# Patient Record
Sex: MALE | Race: WHITE | NOT HISPANIC OR LATINO | URBAN - METROPOLITAN AREA
[De-identification: names, ages, dates, MRNs, and addresses within clinical notes are randomized per-mention and may not be internally consistent; named-entity substitution may affect disease eponyms.]

---

## 2018-04-14 ENCOUNTER — INPATIENT (INPATIENT)
Facility: HOSPITAL | Age: 78
LOS: 3 days | Discharge: HOME | DRG: 247 | End: 2018-04-18
Attending: HOSPITALIST | Admitting: HOSPITALIST
Payer: MEDICARE

## 2018-04-14 VITALS — WEIGHT: 279.99 LBS

## 2018-04-14 DIAGNOSIS — I73.9 PERIPHERAL VASCULAR DISEASE, UNSPECIFIED: Chronic | ICD-10-CM

## 2018-04-14 DIAGNOSIS — G56.00 CARPAL TUNNEL SYNDROME, UNSPECIFIED UPPER LIMB: Chronic | ICD-10-CM

## 2018-04-14 DIAGNOSIS — Z98.84 BARIATRIC SURGERY STATUS: Chronic | ICD-10-CM

## 2018-04-14 DIAGNOSIS — K82.9 DISEASE OF GALLBLADDER, UNSPECIFIED: Chronic | ICD-10-CM

## 2018-04-14 DIAGNOSIS — K35.80 UNSPECIFIED ACUTE APPENDICITIS: Chronic | ICD-10-CM

## 2018-04-14 LAB
ALBUMIN SERPL ELPH-MCNC: 4.1 G/DL — SIGNIFICANT CHANGE UP (ref 3.3–5.2)
ALP SERPL-CCNC: 86 U/L — SIGNIFICANT CHANGE UP (ref 40–120)
ALT FLD-CCNC: 21 U/L — SIGNIFICANT CHANGE UP
ANION GAP SERPL CALC-SCNC: 10 MMOL/L — SIGNIFICANT CHANGE UP (ref 5–17)
ANISOCYTOSIS BLD QL: SLIGHT — SIGNIFICANT CHANGE UP
APPEARANCE UR: CLEAR — SIGNIFICANT CHANGE UP
APTT BLD: 30.1 SEC — SIGNIFICANT CHANGE UP (ref 27.5–37.4)
AST SERPL-CCNC: 28 U/L — SIGNIFICANT CHANGE UP
BILIRUB SERPL-MCNC: 0.4 MG/DL — SIGNIFICANT CHANGE UP (ref 0.4–2)
BILIRUB UR-MCNC: NEGATIVE — SIGNIFICANT CHANGE UP
BUN SERPL-MCNC: 23 MG/DL — HIGH (ref 8–20)
CALCIUM SERPL-MCNC: 9.1 MG/DL — SIGNIFICANT CHANGE UP (ref 8.6–10.2)
CHLORIDE SERPL-SCNC: 102 MMOL/L — SIGNIFICANT CHANGE UP (ref 98–107)
CO2 SERPL-SCNC: 27 MMOL/L — SIGNIFICANT CHANGE UP (ref 22–29)
COLOR SPEC: YELLOW — SIGNIFICANT CHANGE UP
CREAT SERPL-MCNC: 1.08 MG/DL — SIGNIFICANT CHANGE UP (ref 0.5–1.3)
DACRYOCYTES BLD QL SMEAR: SLIGHT — SIGNIFICANT CHANGE UP
DIFF PNL FLD: ABNORMAL
EOSINOPHIL NFR BLD AUTO: 1 % — SIGNIFICANT CHANGE UP (ref 0–6)
GLUCOSE SERPL-MCNC: 78 MG/DL — SIGNIFICANT CHANGE UP (ref 70–115)
GLUCOSE UR QL: NEGATIVE MG/DL — SIGNIFICANT CHANGE UP
HCT VFR BLD CALC: 40.4 % — LOW (ref 42–52)
HGB BLD-MCNC: 13.1 G/DL — LOW (ref 14–18)
INR BLD: 0.96 RATIO — SIGNIFICANT CHANGE UP (ref 0.88–1.16)
KETONES UR-MCNC: NEGATIVE — SIGNIFICANT CHANGE UP
LEUKOCYTE ESTERASE UR-ACNC: NEGATIVE — SIGNIFICANT CHANGE UP
LIDOCAIN IGE QN: 57 U/L — HIGH (ref 22–51)
LYMPHOCYTES # BLD AUTO: 14 % — LOW (ref 20–55)
MACROCYTES BLD QL: SLIGHT — SIGNIFICANT CHANGE UP
MCHC RBC-ENTMCNC: 30.3 PG — SIGNIFICANT CHANGE UP (ref 27–31)
MCHC RBC-ENTMCNC: 32.4 G/DL — SIGNIFICANT CHANGE UP (ref 32–36)
MCV RBC AUTO: 93.5 FL — SIGNIFICANT CHANGE UP (ref 80–94)
MICROCYTES BLD QL: SLIGHT — SIGNIFICANT CHANGE UP
MONOCYTES NFR BLD AUTO: 18 % — HIGH (ref 3–10)
NEUTROPHILS NFR BLD AUTO: 61 % — SIGNIFICANT CHANGE UP (ref 37–73)
NITRITE UR-MCNC: NEGATIVE — SIGNIFICANT CHANGE UP
NT-PROBNP SERPL-SCNC: 145 PG/ML — SIGNIFICANT CHANGE UP (ref 0–300)
OVALOCYTES BLD QL SMEAR: SLIGHT — SIGNIFICANT CHANGE UP
PCP SPEC-MCNC: SIGNIFICANT CHANGE UP
PH UR: 6 — SIGNIFICANT CHANGE UP (ref 5–8)
PLAT MORPH BLD: NORMAL — SIGNIFICANT CHANGE UP
PLATELET # BLD AUTO: 167 K/UL — SIGNIFICANT CHANGE UP (ref 150–400)
POIKILOCYTOSIS BLD QL AUTO: SLIGHT — SIGNIFICANT CHANGE UP
POTASSIUM SERPL-MCNC: 4.2 MMOL/L — SIGNIFICANT CHANGE UP (ref 3.5–5.3)
POTASSIUM SERPL-SCNC: 4.2 MMOL/L — SIGNIFICANT CHANGE UP (ref 3.5–5.3)
PROT SERPL-MCNC: 6.7 G/DL — SIGNIFICANT CHANGE UP (ref 6.6–8.7)
PROT UR-MCNC: NEGATIVE MG/DL — SIGNIFICANT CHANGE UP
PROTHROM AB SERPL-ACNC: 10.6 SEC — SIGNIFICANT CHANGE UP (ref 9.8–12.7)
RBC # BLD: 4.32 M/UL — LOW (ref 4.6–6.2)
RBC # FLD: 13 % — SIGNIFICANT CHANGE UP (ref 11–15.6)
RBC BLD AUTO: ABNORMAL
SODIUM SERPL-SCNC: 139 MMOL/L — SIGNIFICANT CHANGE UP (ref 135–145)
SP GR SPEC: 1.01 — SIGNIFICANT CHANGE UP (ref 1.01–1.02)
TROPONIN T SERPL-MCNC: <0.01 NG/ML — SIGNIFICANT CHANGE UP (ref 0–0.06)
UROBILINOGEN FLD QL: NEGATIVE MG/DL — SIGNIFICANT CHANGE UP
VARIANT LYMPHS # BLD: 6 % — SIGNIFICANT CHANGE UP (ref 0–6)
WBC # BLD: 8.5 K/UL — SIGNIFICANT CHANGE UP (ref 4.8–10.8)
WBC # FLD AUTO: 8.5 K/UL — SIGNIFICANT CHANGE UP (ref 4.8–10.8)

## 2018-04-14 PROCEDURE — 99223 1ST HOSP IP/OBS HIGH 75: CPT

## 2018-04-14 PROCEDURE — 71046 X-RAY EXAM CHEST 2 VIEWS: CPT | Mod: 26

## 2018-04-14 PROCEDURE — 99284 EMERGENCY DEPT VISIT MOD MDM: CPT

## 2018-04-14 PROCEDURE — 99497 ADVNCD CARE PLAN 30 MIN: CPT | Mod: 25

## 2018-04-14 PROCEDURE — 93010 ELECTROCARDIOGRAM REPORT: CPT

## 2018-04-14 RX ORDER — ACETAMINOPHEN 500 MG
650 TABLET ORAL EVERY 6 HOURS
Qty: 0 | Refills: 0 | Status: DISCONTINUED | OUTPATIENT
Start: 2018-04-14 | End: 2018-04-18

## 2018-04-14 RX ORDER — HEPARIN SODIUM 5000 [USP'U]/ML
5000 INJECTION INTRAVENOUS; SUBCUTANEOUS EVERY 12 HOURS
Qty: 0 | Refills: 0 | Status: DISCONTINUED | OUTPATIENT
Start: 2018-04-15 | End: 2018-04-18

## 2018-04-14 RX ORDER — SIMVASTATIN 20 MG/1
40 TABLET, FILM COATED ORAL AT BEDTIME
Qty: 0 | Refills: 0 | Status: DISCONTINUED | OUTPATIENT
Start: 2018-04-14 | End: 2018-04-18

## 2018-04-14 RX ORDER — LISINOPRIL 2.5 MG/1
5 TABLET ORAL DAILY
Qty: 0 | Refills: 0 | Status: DISCONTINUED | OUTPATIENT
Start: 2018-04-14 | End: 2018-04-18

## 2018-04-14 RX ORDER — ALLOPURINOL 300 MG
100 TABLET ORAL
Qty: 0 | Refills: 0 | Status: DISCONTINUED | OUTPATIENT
Start: 2018-04-14 | End: 2018-04-18

## 2018-04-14 RX ORDER — ONDANSETRON 8 MG/1
4 TABLET, FILM COATED ORAL EVERY 4 HOURS
Qty: 0 | Refills: 0 | Status: DISCONTINUED | OUTPATIENT
Start: 2018-04-14 | End: 2018-04-18

## 2018-04-14 RX ORDER — ASPIRIN/CALCIUM CARB/MAGNESIUM 324 MG
81 TABLET ORAL DAILY
Qty: 0 | Refills: 0 | Status: DISCONTINUED | OUTPATIENT
Start: 2018-04-14 | End: 2018-04-18

## 2018-04-14 RX ORDER — CLOPIDOGREL BISULFATE 75 MG/1
75 TABLET, FILM COATED ORAL DAILY
Qty: 0 | Refills: 0 | Status: DISCONTINUED | OUTPATIENT
Start: 2018-04-14 | End: 2018-04-18

## 2018-04-14 RX ORDER — COLCHICINE 0.6 MG
0.6 TABLET ORAL DAILY
Qty: 0 | Refills: 0 | Status: DISCONTINUED | OUTPATIENT
Start: 2018-04-14 | End: 2018-04-18

## 2018-04-14 RX ORDER — SODIUM CHLORIDE 9 MG/ML
3 INJECTION INTRAMUSCULAR; INTRAVENOUS; SUBCUTANEOUS ONCE
Qty: 0 | Refills: 0 | Status: COMPLETED | OUTPATIENT
Start: 2018-04-14 | End: 2018-04-14

## 2018-04-14 RX ADMIN — SODIUM CHLORIDE 3 MILLILITER(S): 9 INJECTION INTRAMUSCULAR; INTRAVENOUS; SUBCUTANEOUS at 23:20

## 2018-04-14 NOTE — H&P ADULT - PROBLEM SELECTOR PLAN 4
in setting of nausea and cp  f/u rpt in AM  gentle hydration  NPO  consider abdominal US vs CT scan if trends upward  pt with benign abdominal exam, denies GI history/etoh abuse  utox negative

## 2018-04-14 NOTE — ED PROVIDER NOTE - CONSTITUTIONAL, MLM
normal... Well appearing, well nourished, awake, alert, oriented to person, place, time/situation and in no apparent distress. Morbidly obese, face is flushed, and talking with a hoarse voice.

## 2018-04-14 NOTE — H&P ADULT - PMH
Coronary artery disease involving native coronary artery of native heart without angina pectoris    Gout    High cholesterol    Hypertension    Myocardial infarction, unspecified MI type, unspecified artery  2016 at [facility] s/p PCI

## 2018-04-14 NOTE — H&P ADULT - HISTORY OF PRESENT ILLNESS
77 y/o male pt with PMHx/o CAD s/p PCI x 8? at [facility] 2016?, PVD, HTN, HLD, and gout who presents to the ED c/o intermittent chest pain since mon/tuesday, with onset 0800 today. Pt states the pain is left sided, feels sharp but began as a pinching, radiates up neck and to shoulder and scapula, is associated with nausea and dyspnea on exertion, is present at rest and with exertion, is 6/10 at its worst and 2/10 at least. No alleviating factors. Pt states he has also had leg swelling right more than left but states the legs are not painful and are usually swollen at baseline and worsen with prolonged standing. Pt also endorses recent BP medication changes due to syncopal episodes. Denies palpitations, v/d, constipation, gu sx, dizziness, visual changes, numbness, weakness, dietary changes, recent travel/prolonged sitting, diuretic use.

## 2018-04-14 NOTE — H&P ADULT - PSH
Acute appendicitis, unspecified acute appendicitis type    Acute carpal tunnel syndrome, unspecified laterality    Gallbladder pain    H/O gastric bypass    PVD (peripheral vascular disease)

## 2018-04-14 NOTE — H&P ADULT - ASSESSMENT
77 yo male with pmh/o CAD s/p PCI, HLD, HTN, admitted with chest pain concerning for ACS with high risk.

## 2018-04-14 NOTE — ED PROVIDER NOTE - MUSCULOSKELETAL, MLM
Spine appears normal, range of motion is not limited, no muscle or joint tenderness Left leg is bigger than right leg.

## 2018-04-14 NOTE — ED ADULT NURSE NOTE - OBJECTIVE STATEMENT
Patient A&Ox4 complaining of midsternal sharp chest pain that radiates to the back. Stated pain is intermittent. Also complaining of nausea. Denies any shortness of breath, or dizziness. Respirations even & unlabored, denies any numbness or tingling. Patient A&Ox4 complaining of midsternal sharp chest pain that radiates to the back. Stated pain is intermittent. Also complaining of nausea & increased swelling to right leg. Denies any shortness of breath, or dizziness. Respirations even & unlabored, denies any numbness or tingling.

## 2018-04-14 NOTE — ED PROVIDER NOTE - ENMT, MLM
Airway patent, Nasal mucosa clear. Mouth with normal mucosa. Throat has no vesicles, no oropharyngeal exudates and uvula is midline. Head :NC AT , no swelling  Eyes :eomi, no swelling  Mouth :mm moist,  Neck : supple, trachea in midline

## 2018-04-14 NOTE — ED PROVIDER NOTE - PHYSICAL EXAMINATION
Constitutional : Appears comfortably, talking in full sentences  Head :NC AT , no swelling  Eyes :eomi, no swelling  Mouth :mm moist,  Neck : supple, trachea in midline  Chest :Stiven air entry, symm chest expansion, no distress  Heart :S1 S2 distant  Abdomen :abd soft, non tender  Musc/Skel :ext no swelling, no deformity, no spine tenderness, distal pulses present  Neuro  :AAO 3 no focal deficits

## 2018-04-14 NOTE — H&P ADULT - PROBLEM SELECTOR PLAN 1
admit to monitored bed  EKG reviewed-no STT elevation/inversion, NSR, EKG prn chest pain  NTG prn chest pain  ASA cont  cont ACE  cont statin  serial troponin, first set negative  TTE  HbA1c  lipid panel  cardiology consulted-Dr. Kamran Peralta  cont plavix  strict i/o  fall precautions  O2 via NC prn, BnP wnl  chest x ray reviewed with no active chest disease on prelim read-f/u official  no rales on exam, legs non tender to palpation with non pitting edema, likely chronic however pt with decreased activity due to cp over past week and reports swelling worse than baseline-f/u LE b/l leg doppler  zofran prn nausea  tylenol prn HA  NPO after midnight admit to monitored bed  EKG reviewed-no STT elevation/inversion, NSR, EKG prn chest pain  NTG prn chest pain  ASA cont  cont ACE  cont statin  serial troponin, first set negative  TTE  HbA1c  lipid panel  cardiology consulted-Dr. Peralta  cont plavix  strict i/o  fall precautions  O2 via NC prn, BnP wnl  chest x ray reviewed with no active chest disease on prelim read-f/u official  no rales on exam, legs non tender to palpation with non pitting edema, likely chronic however pt with decreased activity due to cp over past week and reports swelling worse than baseline-f/u LE b/l leg doppler  zofran prn nausea  tylenol prn HA  NPO after midnight

## 2018-04-14 NOTE — ED PROVIDER NOTE - OBJECTIVE STATEMENT
79 y/o male pt with PMHx presents to the ED c/o intermittent chest pain for the past 3 days. 77 y/o male pt with PMHx HTN, HLD, and gout presents to the ED c/o intermittent chest pain for the past 3 days  sharp pain onset 8-9 today lives by himself. similar pain before. pain radiates to back.  notes ha and nausea took meds   was never told he needs a pacemaker   stents last one june 5t.      blood pressure medication was taking off bc pt passed out no fever chills cough or congestion. having trouble wallking. no recent travel . left leg swelling . notes numbness in hands when laying on specific side.        cardiologist- dr muro 79 y/o male pt with PMHx  stents, HTN, HLD, and gout presents to the ED c/o intermittent chest pain onset 0800 today. Pain is described as sharp and radiates to the back. Pt has had similar pain in the past. Notes left leg swelling, HA, nausea, and difficulty walking. He did take all of his medication today and denies being on blood thinners. Pt notes he was taken off his blood pressure medication because he "passed out". Last stent was put in on June 5th of last year. Pt presents to regular check ups with cardiologist and denies being told he needs a pacemaker. He has sleep apnea and is on a CPAP machine.  Also denies fever, chills, cough, congestion, or recent travel. He lives by himself. No further complaints at this time.     Cardiologist: Dr. Nieto

## 2018-04-15 DIAGNOSIS — I24.9 ACUTE ISCHEMIC HEART DISEASE, UNSPECIFIED: ICD-10-CM

## 2018-04-15 DIAGNOSIS — E78.00 PURE HYPERCHOLESTEROLEMIA, UNSPECIFIED: ICD-10-CM

## 2018-04-15 DIAGNOSIS — D64.9 ANEMIA, UNSPECIFIED: ICD-10-CM

## 2018-04-15 DIAGNOSIS — I10 ESSENTIAL (PRIMARY) HYPERTENSION: ICD-10-CM

## 2018-04-15 DIAGNOSIS — R79.9 ABNORMAL FINDING OF BLOOD CHEMISTRY, UNSPECIFIED: ICD-10-CM

## 2018-04-15 DIAGNOSIS — M1A.9XX0 CHRONIC GOUT, UNSPECIFIED, WITHOUT TOPHUS (TOPHI): ICD-10-CM

## 2018-04-15 DIAGNOSIS — R74.8 ABNORMAL LEVELS OF OTHER SERUM ENZYMES: ICD-10-CM

## 2018-04-15 LAB
ALBUMIN SERPL ELPH-MCNC: 3.7 G/DL — SIGNIFICANT CHANGE UP (ref 3.3–5.2)
ALP SERPL-CCNC: 63 U/L — SIGNIFICANT CHANGE UP (ref 40–120)
ALT FLD-CCNC: 20 U/L — SIGNIFICANT CHANGE UP
AMPHET UR-MCNC: NEGATIVE — SIGNIFICANT CHANGE UP
ANION GAP SERPL CALC-SCNC: 11 MMOL/L — SIGNIFICANT CHANGE UP (ref 5–17)
APTT BLD: 38.2 SEC — HIGH (ref 27.5–37.4)
AST SERPL-CCNC: 25 U/L — SIGNIFICANT CHANGE UP
BACTERIA # UR AUTO: ABNORMAL
BARBITURATES UR SCN-MCNC: NEGATIVE — SIGNIFICANT CHANGE UP
BENZODIAZ UR-MCNC: NEGATIVE — SIGNIFICANT CHANGE UP
BILIRUB SERPL-MCNC: 0.5 MG/DL — SIGNIFICANT CHANGE UP (ref 0.4–2)
BUN SERPL-MCNC: 20 MG/DL — SIGNIFICANT CHANGE UP (ref 8–20)
CALCIUM SERPL-MCNC: 8.7 MG/DL — SIGNIFICANT CHANGE UP (ref 8.6–10.2)
CHLORIDE SERPL-SCNC: 107 MMOL/L — SIGNIFICANT CHANGE UP (ref 98–107)
CHOLEST SERPL-MCNC: 81 MG/DL — LOW (ref 110–199)
CO2 SERPL-SCNC: 24 MMOL/L — SIGNIFICANT CHANGE UP (ref 22–29)
COCAINE METAB.OTHER UR-MCNC: NEGATIVE — SIGNIFICANT CHANGE UP
CREAT SERPL-MCNC: 1.04 MG/DL — SIGNIFICANT CHANGE UP (ref 0.5–1.3)
EPI CELLS # UR: SIGNIFICANT CHANGE UP
FERRITIN SERPL-MCNC: 14 NG/ML — LOW (ref 30–400)
FOLATE SERPL-MCNC: 18.2 NG/ML — HIGH (ref 4–16)
GLUCOSE SERPL-MCNC: 100 MG/DL — SIGNIFICANT CHANGE UP (ref 70–115)
HBA1C BLD-MCNC: 5.5 % — SIGNIFICANT CHANGE UP (ref 4–5.6)
HCT VFR BLD CALC: 39.1 % — LOW (ref 42–52)
HDLC SERPL-MCNC: 36 MG/DL — LOW
HGB BLD-MCNC: 12.7 G/DL — LOW (ref 14–18)
INR BLD: 1.04 RATIO — SIGNIFICANT CHANGE UP (ref 0.88–1.16)
IRON SATN MFR SERPL: 15 % — LOW (ref 16–55)
IRON SATN MFR SERPL: 54 UG/DL — LOW (ref 59–158)
LIDOCAIN IGE QN: 43 U/L — SIGNIFICANT CHANGE UP (ref 22–51)
LIPID PNL WITH DIRECT LDL SERPL: 28 MG/DL — SIGNIFICANT CHANGE UP
MAGNESIUM SERPL-MCNC: 2.2 MG/DL — SIGNIFICANT CHANGE UP (ref 1.8–2.6)
MCHC RBC-ENTMCNC: 29.9 PG — SIGNIFICANT CHANGE UP (ref 27–31)
MCHC RBC-ENTMCNC: 32.5 G/DL — SIGNIFICANT CHANGE UP (ref 32–36)
MCV RBC AUTO: 92 FL — SIGNIFICANT CHANGE UP (ref 80–94)
METHADONE UR-MCNC: NEGATIVE — SIGNIFICANT CHANGE UP
OPIATES UR-MCNC: NEGATIVE — SIGNIFICANT CHANGE UP
PCP UR-MCNC: NEGATIVE — SIGNIFICANT CHANGE UP
PHOSPHATE SERPL-MCNC: 3.9 MG/DL — SIGNIFICANT CHANGE UP (ref 2.4–4.7)
PLATELET # BLD AUTO: 151 K/UL — SIGNIFICANT CHANGE UP (ref 150–400)
POTASSIUM SERPL-MCNC: 4.4 MMOL/L — SIGNIFICANT CHANGE UP (ref 3.5–5.3)
POTASSIUM SERPL-SCNC: 4.4 MMOL/L — SIGNIFICANT CHANGE UP (ref 3.5–5.3)
PROT SERPL-MCNC: 5.9 G/DL — LOW (ref 6.6–8.7)
PROTHROM AB SERPL-ACNC: 11.4 SEC — SIGNIFICANT CHANGE UP (ref 9.8–12.7)
RBC # BLD: 4.25 M/UL — LOW (ref 4.6–6.2)
RBC # BLD: 4.25 M/UL — LOW (ref 4.6–6.2)
RBC # FLD: 13.1 % — SIGNIFICANT CHANGE UP (ref 11–15.6)
RBC CASTS # UR COMP ASSIST: SIGNIFICANT CHANGE UP /HPF (ref 0–4)
RETICS #: 6.6 K/UL — LOW (ref 25–125)
RETICS/RBC NFR: 1.6 % — SIGNIFICANT CHANGE UP (ref 0.5–2.5)
SODIUM SERPL-SCNC: 142 MMOL/L — SIGNIFICANT CHANGE UP (ref 135–145)
THC UR QL: NEGATIVE — SIGNIFICANT CHANGE UP
TIBC SERPL-MCNC: 360 UG/DL — SIGNIFICANT CHANGE UP (ref 220–430)
TOTAL CHOLESTEROL/HDL RATIO MEASUREMENT: 2 RATIO — LOW (ref 3.4–9.6)
TRANSFERRIN SERPL-MCNC: 252 MG/DL — SIGNIFICANT CHANGE UP (ref 180–329)
TRIGL SERPL-MCNC: 86 MG/DL — SIGNIFICANT CHANGE UP (ref 10–200)
TROPONIN T SERPL-MCNC: <0.01 NG/ML — SIGNIFICANT CHANGE UP (ref 0–0.06)
TSH SERPL-MCNC: 3.98 UIU/ML — SIGNIFICANT CHANGE UP (ref 0.27–4.2)
WBC # BLD: 6.4 K/UL — SIGNIFICANT CHANGE UP (ref 4.8–10.8)
WBC # FLD AUTO: 6.4 K/UL — SIGNIFICANT CHANGE UP (ref 4.8–10.8)
WBC UR QL: SIGNIFICANT CHANGE UP

## 2018-04-15 PROCEDURE — 99233 SBSQ HOSP IP/OBS HIGH 50: CPT

## 2018-04-15 PROCEDURE — 93970 EXTREMITY STUDY: CPT | Mod: 26

## 2018-04-15 RX ORDER — SODIUM CHLORIDE 9 MG/ML
1000 INJECTION INTRAMUSCULAR; INTRAVENOUS; SUBCUTANEOUS
Qty: 0 | Refills: 0 | Status: COMPLETED | OUTPATIENT
Start: 2018-04-15 | End: 2018-04-15

## 2018-04-15 RX ORDER — MORPHINE SULFATE 50 MG/1
2 CAPSULE, EXTENDED RELEASE ORAL EVERY 4 HOURS
Qty: 0 | Refills: 0 | Status: DISCONTINUED | OUTPATIENT
Start: 2018-04-15 | End: 2018-04-18

## 2018-04-15 RX ADMIN — Medication 650 MILLIGRAM(S): at 01:44

## 2018-04-15 RX ADMIN — Medication 81 MILLIGRAM(S): at 12:31

## 2018-04-15 RX ADMIN — Medication 100 MILLIGRAM(S): at 06:39

## 2018-04-15 RX ADMIN — SIMVASTATIN 40 MILLIGRAM(S): 20 TABLET, FILM COATED ORAL at 23:25

## 2018-04-15 RX ADMIN — ONDANSETRON 4 MILLIGRAM(S): 8 TABLET, FILM COATED ORAL at 08:48

## 2018-04-15 RX ADMIN — Medication 650 MILLIGRAM(S): at 19:05

## 2018-04-15 RX ADMIN — Medication 100 MILLIGRAM(S): at 18:16

## 2018-04-15 RX ADMIN — HEPARIN SODIUM 5000 UNIT(S): 5000 INJECTION INTRAVENOUS; SUBCUTANEOUS at 06:39

## 2018-04-15 RX ADMIN — SODIUM CHLORIDE 80 MILLILITER(S): 9 INJECTION INTRAMUSCULAR; INTRAVENOUS; SUBCUTANEOUS at 00:45

## 2018-04-15 RX ADMIN — LISINOPRIL 5 MILLIGRAM(S): 2.5 TABLET ORAL at 06:38

## 2018-04-15 RX ADMIN — ONDANSETRON 4 MILLIGRAM(S): 8 TABLET, FILM COATED ORAL at 00:44

## 2018-04-15 RX ADMIN — HEPARIN SODIUM 5000 UNIT(S): 5000 INJECTION INTRAVENOUS; SUBCUTANEOUS at 18:16

## 2018-04-15 RX ADMIN — MORPHINE SULFATE 2 MILLIGRAM(S): 50 CAPSULE, EXTENDED RELEASE ORAL at 08:41

## 2018-04-15 RX ADMIN — Medication 0.6 MILLIGRAM(S): at 12:31

## 2018-04-15 RX ADMIN — CLOPIDOGREL BISULFATE 75 MILLIGRAM(S): 75 TABLET, FILM COATED ORAL at 12:31

## 2018-04-15 RX ADMIN — Medication 650 MILLIGRAM(S): at 00:44

## 2018-04-15 RX ADMIN — Medication 650 MILLIGRAM(S): at 18:17

## 2018-04-15 NOTE — PATIENT PROFILE ADULT. - NS TRANSFER PATIENT BELONGINGS
Jewelry/Money (specify)/Clothing Clothing/Jewelry/Money (specify)/Other belongings/Cell Phone/PDA (specify)/shoes, shirt, pants

## 2018-04-15 NOTE — CONSULT NOTE ADULT - SUBJECTIVE AND OBJECTIVE BOX
Patient is a 78y old  Male who presents with a chief complaint of chest pain      HPI:  79 y/o male pt with PMHx/o CAD s/p PCI x 4 at [facility] 2016 as well as in  and , PVD, HTN, HLD, and gout who presents to the ED c/o intermittent chest pain since mon/tuesday, with onset 0800 today. Pt states the pain is left sided, feels sharp but began as a pinching and knife like sensation, radiates up neck and to shoulder and scapula, is associated with nausea and dyspnea on exertion, is present at rest and with exertion. No alleviating factors. Pt states he has also had leg swelling right more than left but states the legs are not painful and are usually swollen at baseline and worsen with prolonged standing. Pt also endorses recent BP medication changes due to syncopal episodes. Denies palpitations, v/d, constipation, gu sx, dizziness, visual changes, numbness, weakness, dietary changes, recent travel/prolonged sitting, diuretic use.      PAST MEDICAL & SURGICAL HISTORY:  Myocardial infarction, unspecified MI type, unspecified artery: 2016 at [facility] s/p PCI  Coronary artery disease involving native coronary artery of native heart without angina pectoris  Gout  High cholesterol  Hypertension  H/O gastric bypass  Gallbladder pain  Acute appendicitis, unspecified acute appendicitis type  Acute carpal tunnel syndrome, unspecified laterality  PVD (peripheral vascular disease)    Allergies    No Known Allergies    Intolerances        MEDICATIONS  (STANDING):  allopurinol 100 milliGRAM(s) Oral two times a day  aspirin  chewable 81 milliGRAM(s) Oral daily  clopidogrel Tablet 75 milliGRAM(s) Oral daily  colchicine 0.6 milliGRAM(s) Oral daily  heparin  Injectable 5000 Unit(s) SubCutaneous every 12 hours  lisinopril 5 milliGRAM(s) Oral daily  simvastatin 40 milliGRAM(s) Oral at bedtime    MEDICATIONS  (PRN):  acetaminophen   Tablet 650 milliGRAM(s) Oral every 6 hours PRN For Temp greater than 38 C (100.4 F)  acetaminophen   Tablet. 650 milliGRAM(s) Oral every 6 hours PRN Moderate Pain (4 - 6)  morphine  - Injectable 2 milliGRAM(s) IV Push every 4 hours PRN Severe Pain (7 - 10)  ondansetron Injectable 4 milliGRAM(s) IV Push every 4 hours PRN Nausea and/or Vomiting    acetaminophen   Tablet 650 milliGRAM(s) Oral every 6 hours PRN  acetaminophen   Tablet. 650 milliGRAM(s) Oral every 6 hours PRN  allopurinol 100 milliGRAM(s) Oral two times a day  aspirin  chewable 81 milliGRAM(s) Oral daily  clopidogrel Tablet 75 milliGRAM(s) Oral daily  colchicine 0.6 milliGRAM(s) Oral daily  heparin  Injectable 5000 Unit(s) SubCutaneous every 12 hours  lisinopril 5 milliGRAM(s) Oral daily  morphine  - Injectable 2 milliGRAM(s) IV Push every 4 hours PRN  ondansetron Injectable 4 milliGRAM(s) IV Push every 4 hours PRN  simvastatin 40 milliGRAM(s) Oral at bedtime      FAMILY HISTORY:  Family history of myocardial infarction (Sibling): brother at 39 yo      SOCIAL HISTORY:    CIGARETTES: Never    ALCOHOL: Rare    REVIEW OF SYSTEMS:  CONSTITUTIONAL: No fever, weight loss, or fatigue  EYES: No eye pain, visual disturbances, or discharge  ENMT:  No difficulty hearing, tinnitus, vertigo; No sinus or throat pain  NECK: No pain or stiffness  RESPIRATORY: No cough, wheezing, chills or hemoptysis; No Shortness of Breath  CARDIOVASCULAR: No chest pain, palpitations, passing out, dizziness, or leg swelling  GASTROINTESTINAL: No abdominal or epigastric pain. No nausea, vomiting, or hematemesis; No diarrhea or constipation. No melena or hematochezia.  GENITOURINARY: No dysuria, frequency, hematuria, or incontinence  NEUROLOGICAL: No headaches, memory loss, loss of strength, numbness, or tremors  SKIN: No itching, burning, rashes, or lesions   LYMPH Nodes: No enlarged glands  ENDOCRINE: No heat or cold intolerance; No hair loss  MUSCULOSKELETAL: No joint pain or swelling; No muscle, back, or extremity pain  PSYCHIATRIC: No depression, anxiety, mood swings, or difficulty sleeping  HEME/LYMPH: No easy bruising, or bleeding gums  ALLERY AND IMMUNOLOGIC: No hives or eczema	    Vital Signs Last 24 Hrs  T(C): 36.8 (15 Apr 2018 10:10), Max: 36.9 (15 Apr 2018 00:10)  T(F): 98.3 (15 Apr 2018 10:10), Max: 98.5 (15 Apr 2018 00:10)  HR: 57 (15 Apr 2018 10:10) (55 - 82)  BP: 132/77 (15 Apr 2018 10:10) (127/60 - 153/70)  BP(mean): --  RR: 17 (15 Apr 2018 10:10) (17 - 20)  SpO2: 98% (15 Apr 2018 10:10) (94% - 98%)    Daily     Daily     I&O's Detail      PHYSICAL EXAM:  Appearance: Normal, well nourished	  HEENT:   Normal oral mucosa, PERRL, EOMI, sclera non-icteric	  Lymphatic: No cervical lymphadenopathy  Cardiovascular: Normal S1 S2, No JVD, No cardiac murmurs, No carotid bruits, No peripheral edema  Respiratory: Lungs clear to auscultation	  Psychiatry: A & O x 3, Mood & affect appropriate  Gastrointestinal:  Soft, Non-tender, + BS, no bruits	  Skin: No rashes, No ecchymoses, No cyanosis  Neurologic: Grossly non-focal with full strength in all four extremities  Extremities: Normal range of motion, No clubbing, cyanosis or edema  Vascular: Peripheral pulses palpable 2+ bilaterally      LABS:                        12.7   6.4   )-----------( 151      ( 15 Apr 2018 05:45 )             39.1     04-15    142  |  107  |  20.0  ----------------------------<  100  4.4   |  24.0  |  1.04    Ca    8.7      15 Apr 2018 05:46  Phos  3.9     04-15  Mg     2.2     04-15    TPro  5.9<L>  /  Alb  3.7  /  TBili  0.5  /  DBili  x   /  AST  25  /  ALT  20  /  AlkPhos  63  04-15    CARDIAC MARKERS ( 15 Apr 2018 05:46 )  x     / <0.01 ng/mL / x     / x     / x      CARDIAC MARKERS ( 2018 22:42 )  x     / <0.01 ng/mL / x     / x     / x          PT/INR - ( 15 Apr 2018 05:45 )   PT: 11.4 sec;   INR: 1.04 ratio         PTT - ( 15 Apr 2018 05:45 )  PTT:38.2 sec  Urinalysis Basic - ( 2018 23:38 )    Color: Yellow / Appearance: Clear / S.010 / pH: x  Gluc: x / Ketone: Negative  / Bili: Negative / Urobili: Negative mg/dL   Blood: x / Protein: Negative mg/dL / Nitrite: Negative   Leuk Esterase: Negative / RBC: 0-2 /HPF / WBC 0-2   Sq Epi: x / Non Sq Epi: Occasional / Bacteria: Occasional      I&O's Summary    BNPSerum Pro-Brain Natriuretic Peptide: 145 pg/mL ( @ 22:42)    RADIOLOGY & ADDITIONAL STUDIES:    Assessment:  79 y/o male pt with PMHx/o CAD s/p PCI x 4 at [facility] 2016 as well as in  and , PVD, HTN, HLD, and gout who presents to the ED c/o intermittent chest pain since mon/tuesday, with onset 0800 today. Pt states the pain is left sided, feels sharp but began as a pinching and knife like sensation, radiates up neck and to shoulder and scapula, is associated with nausea and dyspnea on exertion, is present at rest and with exertion. No alleviating factors. Pt states he has also had leg swelling right more than left but states the legs are not painful and are usually swollen at baseline and worsen with prolonged standing. Pt also endorses recent BP medication changes due to syncopal episodes. Denies palpitations, v/d, constipation, gu sx, dizziness, visual changes, numbness, weakness, dietary changes, recent travel/prolonged sitting, diuretic use.      Plan:  There are some typical and some atypical components to patient's symptoms  At this point I would suggest the following  Echo  Bilateral lower extremity venous doppler  Consider CTA to rule out a PE  Further recommendations as per Dr Peralta who will resume care tomorrow.

## 2018-04-16 LAB
ANION GAP SERPL CALC-SCNC: 11 MMOL/L — SIGNIFICANT CHANGE UP (ref 5–17)
BUN SERPL-MCNC: 21 MG/DL — HIGH (ref 8–20)
CALCIUM SERPL-MCNC: 8.3 MG/DL — LOW (ref 8.6–10.2)
CHLORIDE SERPL-SCNC: 103 MMOL/L — SIGNIFICANT CHANGE UP (ref 98–107)
CO2 SERPL-SCNC: 26 MMOL/L — SIGNIFICANT CHANGE UP (ref 22–29)
CREAT SERPL-MCNC: 1.11 MG/DL — SIGNIFICANT CHANGE UP (ref 0.5–1.3)
GLUCOSE SERPL-MCNC: 101 MG/DL — SIGNIFICANT CHANGE UP (ref 70–115)
HCT VFR BLD CALC: 39.1 % — LOW (ref 42–52)
HGB BLD-MCNC: 12.6 G/DL — LOW (ref 14–18)
MCHC RBC-ENTMCNC: 30.2 PG — SIGNIFICANT CHANGE UP (ref 27–31)
MCHC RBC-ENTMCNC: 32.2 G/DL — SIGNIFICANT CHANGE UP (ref 32–36)
MCV RBC AUTO: 93.8 FL — SIGNIFICANT CHANGE UP (ref 80–94)
PLATELET # BLD AUTO: 168 K/UL — SIGNIFICANT CHANGE UP (ref 150–400)
POTASSIUM SERPL-MCNC: 4.4 MMOL/L — SIGNIFICANT CHANGE UP (ref 3.5–5.3)
POTASSIUM SERPL-SCNC: 4.4 MMOL/L — SIGNIFICANT CHANGE UP (ref 3.5–5.3)
RBC # BLD: 4.17 M/UL — LOW (ref 4.6–6.2)
RBC # FLD: 13.1 % — SIGNIFICANT CHANGE UP (ref 11–15.6)
SODIUM SERPL-SCNC: 140 MMOL/L — SIGNIFICANT CHANGE UP (ref 135–145)
WBC # BLD: 5.3 K/UL — SIGNIFICANT CHANGE UP (ref 4.8–10.8)
WBC # FLD AUTO: 5.3 K/UL — SIGNIFICANT CHANGE UP (ref 4.8–10.8)

## 2018-04-16 PROCEDURE — 99233 SBSQ HOSP IP/OBS HIGH 50: CPT

## 2018-04-16 PROCEDURE — 93306 TTE W/DOPPLER COMPLETE: CPT | Mod: 26

## 2018-04-16 PROCEDURE — 71275 CT ANGIOGRAPHY CHEST: CPT | Mod: 26

## 2018-04-16 PROCEDURE — 93010 ELECTROCARDIOGRAM REPORT: CPT

## 2018-04-16 RX ORDER — ISOSORBIDE MONONITRATE 60 MG/1
30 TABLET, EXTENDED RELEASE ORAL DAILY
Qty: 0 | Refills: 0 | Status: DISCONTINUED | OUTPATIENT
Start: 2018-04-16 | End: 2018-04-17

## 2018-04-16 RX ORDER — ACETAMINOPHEN 500 MG
650 TABLET ORAL EVERY 6 HOURS
Qty: 0 | Refills: 0 | Status: DISCONTINUED | OUTPATIENT
Start: 2018-04-16 | End: 2018-04-18

## 2018-04-16 RX ADMIN — Medication 650 MILLIGRAM(S): at 21:12

## 2018-04-16 RX ADMIN — Medication 100 MILLIGRAM(S): at 06:33

## 2018-04-16 RX ADMIN — ONDANSETRON 4 MILLIGRAM(S): 8 TABLET, FILM COATED ORAL at 21:11

## 2018-04-16 RX ADMIN — HEPARIN SODIUM 5000 UNIT(S): 5000 INJECTION INTRAVENOUS; SUBCUTANEOUS at 17:10

## 2018-04-16 RX ADMIN — Medication 100 MILLIGRAM(S): at 17:10

## 2018-04-16 RX ADMIN — CLOPIDOGREL BISULFATE 75 MILLIGRAM(S): 75 TABLET, FILM COATED ORAL at 11:25

## 2018-04-16 RX ADMIN — ISOSORBIDE MONONITRATE 30 MILLIGRAM(S): 60 TABLET, EXTENDED RELEASE ORAL at 17:10

## 2018-04-16 RX ADMIN — HEPARIN SODIUM 5000 UNIT(S): 5000 INJECTION INTRAVENOUS; SUBCUTANEOUS at 06:33

## 2018-04-16 RX ADMIN — Medication 0.6 MILLIGRAM(S): at 11:25

## 2018-04-16 RX ADMIN — Medication 81 MILLIGRAM(S): at 11:26

## 2018-04-16 RX ADMIN — SIMVASTATIN 40 MILLIGRAM(S): 20 TABLET, FILM COATED ORAL at 21:11

## 2018-04-17 LAB
ANION GAP SERPL CALC-SCNC: 12 MMOL/L — SIGNIFICANT CHANGE UP (ref 5–17)
BUN SERPL-MCNC: 21 MG/DL — HIGH (ref 8–20)
CALCIUM SERPL-MCNC: 8.1 MG/DL — LOW (ref 8.6–10.2)
CHLORIDE SERPL-SCNC: 105 MMOL/L — SIGNIFICANT CHANGE UP (ref 98–107)
CO2 SERPL-SCNC: 25 MMOL/L — SIGNIFICANT CHANGE UP (ref 22–29)
CREAT SERPL-MCNC: 1.16 MG/DL — SIGNIFICANT CHANGE UP (ref 0.5–1.3)
GLUCOSE SERPL-MCNC: 103 MG/DL — SIGNIFICANT CHANGE UP (ref 70–115)
HCT VFR BLD CALC: 36.6 % — LOW (ref 42–52)
HGB BLD-MCNC: 11.8 G/DL — LOW (ref 14–18)
MCHC RBC-ENTMCNC: 30.3 PG — SIGNIFICANT CHANGE UP (ref 27–31)
MCHC RBC-ENTMCNC: 32.2 G/DL — SIGNIFICANT CHANGE UP (ref 32–36)
MCV RBC AUTO: 93.8 FL — SIGNIFICANT CHANGE UP (ref 80–94)
PLATELET # BLD AUTO: 176 K/UL — SIGNIFICANT CHANGE UP (ref 150–400)
POTASSIUM SERPL-MCNC: 4.3 MMOL/L — SIGNIFICANT CHANGE UP (ref 3.5–5.3)
POTASSIUM SERPL-SCNC: 4.3 MMOL/L — SIGNIFICANT CHANGE UP (ref 3.5–5.3)
RBC # BLD: 3.9 M/UL — LOW (ref 4.6–6.2)
RBC # FLD: 12.8 % — SIGNIFICANT CHANGE UP (ref 11–15.6)
SODIUM SERPL-SCNC: 142 MMOL/L — SIGNIFICANT CHANGE UP (ref 135–145)
WBC # BLD: 5.9 K/UL — SIGNIFICANT CHANGE UP (ref 4.8–10.8)
WBC # FLD AUTO: 5.9 K/UL — SIGNIFICANT CHANGE UP (ref 4.8–10.8)

## 2018-04-17 PROCEDURE — 99232 SBSQ HOSP IP/OBS MODERATE 35: CPT

## 2018-04-17 PROCEDURE — 93010 ELECTROCARDIOGRAM REPORT: CPT

## 2018-04-17 RX ADMIN — CLOPIDOGREL BISULFATE 75 MILLIGRAM(S): 75 TABLET, FILM COATED ORAL at 12:36

## 2018-04-17 RX ADMIN — Medication 81 MILLIGRAM(S): at 12:36

## 2018-04-17 RX ADMIN — Medication 100 MILLIGRAM(S): at 21:49

## 2018-04-17 RX ADMIN — ISOSORBIDE MONONITRATE 30 MILLIGRAM(S): 60 TABLET, EXTENDED RELEASE ORAL at 12:36

## 2018-04-17 RX ADMIN — SIMVASTATIN 40 MILLIGRAM(S): 20 TABLET, FILM COATED ORAL at 21:49

## 2018-04-17 RX ADMIN — Medication 0.6 MILLIGRAM(S): at 12:36

## 2018-04-17 NOTE — PROGRESS NOTE ADULT - ASSESSMENT
Problem: ACS (acute coronary syndrome).  Plan:   EKG reviewed-no STT elevation/inversion, NSR, EKG . CXR no pathology  NTG prn chest pain  ASA 81mg po daily  Plavix 75mg po daily    cont ACE Lisinopril 5mg po daily   cont statin simvastatin 20mg po daily   serial troponin, first set negative  TTE ordered , Leg dopplers ordered.  HbA1c 5.5  lipid panel- no elevation   cardiology consulted-Dr. Peralta.  Associate review, recommend echo, leg dopplers , CTA to rule out PE.        Problem/Plan - 2:  ·  Problem: Elevated BUN.  Plan: Resolved on gentle hydration .      Problem/Plan - 4:  ·  Problem: Elevated lipase.  Plan: in setting of nausea and cp  f/u rpt in AM is normal value.  pt with benign abdominal exam, denies GI history/etoh abuse  utox negative.      Problem/Plan - 5:  ·  Problem: High cholesterol.  Plan: cont statin  f/u lipid panel.      Problem/Plan - 6:  Problem: Essential hypertension. Plan: cont lisinopril at home dose.     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  Plan: cont allopurinol and colchicine at home dose.
Problem: ACS (acute coronary syndrome).  Plan:   EKG reviewed-no STT elevation/inversion, NSR, EKG . CXR no pathology, NTG prn chest pain, ASA 81mg po daily, Plavix 75mg po daily, cont ACE Lisinopril 5mg po daily,   cont statin simvastatin 20mg po daily, serial troponin, negative, TTE ordered done showed Normal global left ventricular systolic function, Left ventricular ejection fraction 65 to 70%, Sclerotic aortic valve with normal opening, Mild aortic regurgitation, The ascending aorta is dilated measuring 4.3 cm, Leg dopplers No evidence of bilateral lower extremity deep venous thrombosis, HbA1c 5.5, lipid panel- no elevation cardiology consulted-Dr. Peralta saw the patient, plan is cardiac cath tomorrow, CTA chest showed No pulmonary embolism or other acute findings.       Problem/Plan - 2:  ·  Problem: Elevated BUN.  Plan: Gentle hydration, will continued to monitor BMP      Problem/Plan - 4:  ·  Problem: Elevated lipase: In setting of nausea and cp, no more nausea, f/u rpt in AM is normal value, pt with benign abdominal exam, denies GI history etoh abuse  utox negative, repeat Lipase is normal.       Problem/Plan - 5:  ·  Problem: High cholesterol.  Plan: cont statin, f/u lipid panel.      Problem/Plan - 6:  Problem: Essential hypertension. Plan: cont lisinopril at home dose.     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  Plan: cont allopurinol and colchicine at home dose.
Problem: ACS (acute coronary syndrome).  Plan:   EKG reviewed-no STT elevation/inversion, NSR, EKG . CXR no pathology, NTG prn chest pain, ASA 81mg po daily, Plavix 75mg po daily, cont ACE Lisinopril 5mg po daily, cont statin simvastatin 20mg po daily, serial troponin, negative, TTE ordered done showed Normal global left ventricular systolic function, Left ventricular ejection fraction 65 to 70%, Sclerotic aortic valve with normal opening, Mild aortic regurgitation, The ascending aorta is dilated measuring 4.3 cm, Leg dopplers No evidence of bilateral lower extremity deep venous thrombosis, HbA1c 5.5, lipid panel- no elevation, CTA chest showed No pulmonary embolism or other acute findings cardiology consulted-Dr. Peralta saw the patient, plan is going for the cardiac cath today, will follow the results.        Problem/Plan - 2:  ·  Problem: Elevated BUN.  Plan: got gentle hydration, will continued to monitor BMP      Problem/Plan - 4:  ·  Problem: Elevated lipase: In setting of nausea and cp, no more nausea, f/u rpt in AM is normal value, pt with benign abdominal exam, denies GI history etoh abuse, utox negative, repeat Lipase is normal.       Problem/Plan - 5:  ·  Problem: High cholesterol.  Plan: cont statin, f/u lipid panel.      Problem/Plan - 6:  Problem: Essential hypertension. Plan: cont lisinopril at home dose.     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  Plan: cont allopurinol and colchicine at home dose.

## 2018-04-18 ENCOUNTER — TRANSCRIPTION ENCOUNTER (OUTPATIENT)
Age: 78
End: 2018-04-18

## 2018-04-18 VITALS
RESPIRATION RATE: 18 BRPM | HEART RATE: 60 BPM | DIASTOLIC BLOOD PRESSURE: 68 MMHG | SYSTOLIC BLOOD PRESSURE: 140 MMHG | OXYGEN SATURATION: 97 % | TEMPERATURE: 98 F

## 2018-04-18 LAB
ANION GAP SERPL CALC-SCNC: 8 MMOL/L — SIGNIFICANT CHANGE UP (ref 5–17)
APTT BLD: 29.6 SEC — SIGNIFICANT CHANGE UP (ref 27.5–37.4)
BUN SERPL-MCNC: 17 MG/DL — SIGNIFICANT CHANGE UP (ref 8–20)
CALCIUM SERPL-MCNC: 8.4 MG/DL — LOW (ref 8.6–10.2)
CHLORIDE SERPL-SCNC: 104 MMOL/L — SIGNIFICANT CHANGE UP (ref 98–107)
CO2 SERPL-SCNC: 29 MMOL/L — SIGNIFICANT CHANGE UP (ref 22–29)
CREAT SERPL-MCNC: 1.21 MG/DL — SIGNIFICANT CHANGE UP (ref 0.5–1.3)
GLUCOSE SERPL-MCNC: 87 MG/DL — SIGNIFICANT CHANGE UP (ref 70–115)
HCT VFR BLD CALC: 37.9 % — LOW (ref 42–52)
HGB BLD-MCNC: 12.4 G/DL — LOW (ref 14–18)
INR BLD: 1.01 RATIO — SIGNIFICANT CHANGE UP (ref 0.88–1.16)
MCHC RBC-ENTMCNC: 30.6 PG — SIGNIFICANT CHANGE UP (ref 27–31)
MCHC RBC-ENTMCNC: 32.7 G/DL — SIGNIFICANT CHANGE UP (ref 32–36)
MCV RBC AUTO: 93.6 FL — SIGNIFICANT CHANGE UP (ref 80–94)
PLATELET # BLD AUTO: 177 K/UL — SIGNIFICANT CHANGE UP (ref 150–400)
POTASSIUM SERPL-MCNC: 4.2 MMOL/L — SIGNIFICANT CHANGE UP (ref 3.5–5.3)
POTASSIUM SERPL-SCNC: 4.2 MMOL/L — SIGNIFICANT CHANGE UP (ref 3.5–5.3)
PROTHROM AB SERPL-ACNC: 11.1 SEC — SIGNIFICANT CHANGE UP (ref 9.8–12.7)
RBC # BLD: 4.05 M/UL — LOW (ref 4.6–6.2)
RBC # FLD: 12.7 % — SIGNIFICANT CHANGE UP (ref 11–15.6)
SODIUM SERPL-SCNC: 141 MMOL/L — SIGNIFICANT CHANGE UP (ref 135–145)
WBC # BLD: 7.3 K/UL — SIGNIFICANT CHANGE UP (ref 4.8–10.8)
WBC # FLD AUTO: 7.3 K/UL — SIGNIFICANT CHANGE UP (ref 4.8–10.8)

## 2018-04-18 PROCEDURE — 93010 ELECTROCARDIOGRAM REPORT: CPT

## 2018-04-18 PROCEDURE — 99239 HOSP IP/OBS DSCHRG MGMT >30: CPT

## 2018-04-18 RX ORDER — CLOPIDOGREL BISULFATE 75 MG/1
1 TABLET, FILM COATED ORAL
Qty: 30 | Refills: 1
Start: 2018-04-18 | End: 2018-06-16

## 2018-04-18 RX ORDER — ASPIRIN/CALCIUM CARB/MAGNESIUM 324 MG
1 TABLET ORAL
Qty: 0 | Refills: 0 | DISCHARGE
Start: 2018-04-18

## 2018-04-18 RX ORDER — LISINOPRIL 2.5 MG/1
1 TABLET ORAL
Qty: 0 | Refills: 0 | DISCHARGE
Start: 2018-04-18

## 2018-04-18 RX ORDER — ASPIRIN/CALCIUM CARB/MAGNESIUM 324 MG
1 TABLET ORAL
Qty: 0 | Refills: 0 | COMMUNITY

## 2018-04-18 RX ORDER — LISINOPRIL 2.5 MG/1
1 TABLET ORAL
Qty: 0 | Refills: 0 | COMMUNITY

## 2018-04-18 RX ORDER — CLOPIDOGREL BISULFATE 75 MG/1
1 TABLET, FILM COATED ORAL
Qty: 0 | Refills: 0 | COMMUNITY

## 2018-04-18 RX ADMIN — HEPARIN SODIUM 5000 UNIT(S): 5000 INJECTION INTRAVENOUS; SUBCUTANEOUS at 11:03

## 2018-04-18 RX ADMIN — Medication 0.6 MILLIGRAM(S): at 11:03

## 2018-04-18 RX ADMIN — Medication 100 MILLIGRAM(S): at 05:54

## 2018-04-18 RX ADMIN — CLOPIDOGREL BISULFATE 75 MILLIGRAM(S): 75 TABLET, FILM COATED ORAL at 11:02

## 2018-04-18 RX ADMIN — Medication 81 MILLIGRAM(S): at 11:03

## 2018-04-18 RX ADMIN — LISINOPRIL 5 MILLIGRAM(S): 2.5 TABLET ORAL at 11:03

## 2018-04-18 NOTE — DISCHARGE NOTE ADULT - PLAN OF CARE
Continue with current treatment, follow up with Dr Wan Low Fat and low sodium diet Follow up with Dr Wan drink plenty of water Resolved

## 2018-04-18 NOTE — DISCHARGE NOTE ADULT - CARE PLAN
Principal Discharge DX:	ACS (acute coronary syndrome)  Goal:	Continue with current treatment, follow up with Dr Wan  Assessment and plan of treatment:	Low Fat and low sodium diet  Secondary Diagnosis:	CAD (coronary artery disease)  Goal:	Follow up with Dr Wan  Secondary Diagnosis:	Chronic gout without tophus, unspecified cause, unspecified site  Secondary Diagnosis:	Elevated BUN  Goal:	drink plenty of water  Secondary Diagnosis:	Elevated lipase  Goal:	Resolved  Secondary Diagnosis:	Essential hypertension

## 2018-04-18 NOTE — DISCHARGE NOTE ADULT - PROVIDER TOKENS
FREE:[LAST:[Dr Wan],PHONE:[(   )    -],FAX:[(   )    -],ADDRESS:[Cardiology in 2 weeks, please call his office and get an appiontment]],FREE:[LAST:[Dr Jeff],PHONE:[(   )    -],FAX:[(   )    -],ADDRESS:[PMD in 1 week, please call his office and get an appiontment.]]

## 2018-04-18 NOTE — DISCHARGE NOTE ADULT - MEDICATION SUMMARY - MEDICATIONS TO TAKE
I will START or STAY ON the medications listed below when I get home from the hospital:    aspirin 81 mg oral tablet, chewable  -- 1 tab(s) by mouth once a day  -- Indication: For CAD    lisinopril 5 mg oral tablet  -- 1 tab(s) by mouth once a day  -- Indication: For HTN     colchicine 0.6 mg oral tablet  -- 1 tab(s) by mouth once a day  -- Indication: For Gout     allopurinol 100 mg oral tablet  -- 1 tab(s) by mouth 2 times a day  -- Indication: For Gout     simvastatin 40 mg oral tablet  -- 1 tab(s) by mouth once a day (at bedtime)  -- Indication: For HLD    clopidogrel 75 mg oral tablet  -- 1 tab(s) by mouth once a day  -- Indication: For CAD

## 2018-04-18 NOTE — DISCHARGE NOTE ADULT - HOSPITAL COURSE
HPI:   77 y/o male pt with PMHx/o CAD s/p PCI x 8? at [facility] 2016?, PVD, HTN, HLD, and gout who presents to the ED c/o intermittent chest pain since mon/tuesday, with onset 0800 today. Pt states the pain is left sided, feels sharp but began as a pinching, radiates up neck and to shoulder and scapula, is associated with nausea and dyspnea on exertion, is present at rest and with exertion, is 6/10 at its worst and 2/10 at least. No alleviating factors. Pt states he has also had leg swelling right more than left but states the legs are not painful and are usually swollen at baseline and worsen with prolonged standing. Pt also endorses recent BP medication changes due to syncopal episodes. Denies palpitations, v/d, constipation, gu sx, dizziness, visual changes, numbness, weakness, dietary changes, recent travel/prolonged sitting, diuretic use.     Hospital course:   patient was admitted under medicine, hooked up with cardiac monitor, EKG reviewed-no STT elevation/inversion, NSR, EKG, CXR no pathology, NTG prn chest pain, ASA 81mg po daily, Plavix 75mg po daily, cont ACE Lisinopril 5mg po daily, cont statin simvastatin, serial troponin, negative, TTE ordered done showed Normal global left ventricular systolic function, Left ventricular ejection fraction 65 to 70%, Sclerotic aortic valve with normal opening, Mild aortic regurgitation, The ascending aorta is dilated measuring 4.3 cm, Leg dopplers No evidence of bilateral lower extremity deep venous thrombosis, HbA1c 5.5, lipid panel- no elevation, CTA chest showed No pulmonary embolism or other acute findings cardiology consulted-Dr. Peralta saw the patient, patient had cardiac cath and s/p RENATE to the mid LAD, patient has no more symptoms, denies any more SOB or chest pressure or pain.     For his Elevated BUN, he got gentle hydration, monitored BMP, looks like at his baseline, during the earlier course, he was found to have Elevated lipase in setting of nausea and cp, got IV fluids, no more nausea, repeat lipase WNL, benign abdominal exam no abdominal pain.     patient is doing well, his symptoms resolved, he is being discharged home in a stable condition.     Vital Signs Last 24 Hrs  T(C): 36.7 (18 Apr 2018 10:09), Max: 36.7 (18 Apr 2018 10:09)  T(F): 98 (18 Apr 2018 10:09), Max: 98 (18 Apr 2018 10:09)  HR: 62 (18 Apr 2018 10:09) (56 - 62)  BP: 146/66 (18 Apr 2018 10:09) (96/54 - 146/66)  RR: 18 (18 Apr 2018 10:09) (17 - 20)  SpO2: 97% (18 Apr 2018 05:49) (94% - 98%)    PHYSICAL EXAM:    GENERAL: Elderly male looking comfortable   HEENT: PERRL, +EOMI  NECK: soft, Supple, No JVD,   CHEST/LUNG: Decrease air entry bilaterally; No wheezing  HEART: S1S2+, Regular rate and rhythm; No murmurs  ABDOMEN: Soft, Nontender, Nondistended; Bowel sounds present  EXTREMITIES:  1+ Peripheral Pulses, No edema  SKIN: No rashes or lesions  NEURO: AAOX3, no focal deficits, no motor r sensory loss  PSYCH: normal mood    Total time spent 40minutes.

## 2018-04-18 NOTE — DISCHARGE NOTE ADULT - SECONDARY DIAGNOSIS.
CAD (coronary artery disease) Chronic gout without tophus, unspecified cause, unspecified site Elevated BUN Elevated lipase Essential hypertension

## 2018-04-18 NOTE — PROGRESS NOTE ADULT - SUBJECTIVE AND OBJECTIVE BOX
CC: CAD, obesity, intermittent chest pain. Right calf pain and swelling .   HPI:  77 y/o male pt with PMHx/o CAD s/p PCI x 8? at [facility] 2016?, PVD, HTN, HLD, and gout who presents to the ED c/o intermittent chest pain since mon/tuesday, with onset 0800 today. Pt states the pain is left sided, feels sharp but began as a pinching, radiates up neck and to shoulder and scapula, is associated with nausea and dyspnea on exertion, is present at rest and with exertion, is 6/10 at its worst and 2/10 at least. No alleviating factors. Pt states he has also had leg swelling right more than left but states the legs are not painful and are usually swollen at baseline and worsen with prolonged standing. Pt also endorses recent BP medication changes due to syncopal episodes. Denies palpitations, v/d, constipation, gu sx, dizziness, visual changes, numbness, weakness, dietary changes, recent travel/prolonged sitting, diuretic use. (2018 23:32)    REVIEW OF SYSTEMS:    Patient denied fever, chills, abdominal pain, nausea, vomiting, cough, shortness of breath, chest pain or palpitations    Vital Signs Last 24 Hrs  T(C): 36.8 (15 Apr 2018 10:10), Max: 36.9 (15 Apr 2018 00:10)  T(F): 98.3 (15 Apr 2018 10:10), Max: 98.5 (15 Apr 2018 00:10)  HR: 57 (15 Apr 2018 10:10) (55 - 82)  BP: 132/77 (15 Apr 2018 10:10) (127/60 - 153/70)  BP(mean): --  RR: 17 (15 Apr 2018 10:10) (17 - 20)  SpO2: 98% (15 Apr 2018 10:10) (94% - 98%)I&O's Summary    PHYSICAL EXAM:  GENERAL: NAD, Obese  HEENT: PERRL, +EOMI, anicteric, no [place]  NECK: Supple, No JVD   CHEST/LUNG: CTA bilaterally; Normal effort  HEART: S1S2 Normal intensity, no murmurs, gallops or rubs noted  ABDOMEN: Soft, BS Normoactive, NT, ND, no HSM noted  EXTREMITIES:  2+ radial and DP pulses noted, no clubbing, cyanosis, or edema noted, Limited mobility   SKIN: No rashes or lesions noted  NEURO: A&Ox3, no focal deficits noted, CN II-XII intact  PSYCH: Anxious and depressed mood and affect; insight/judgement appropriate  LABS:                        12.7   6.4   )-----------( 151      ( 15 Apr 2018 05:45 )             39.1     04-15    142  |  107  |  20.0  ----------------------------<  100  4.4   |  24.0  |  1.04    Ca    8.7      15 Apr 2018 05:46  Phos  3.9     04-15  Mg     2.2     04-15    TPro  5.9<L>  /  Alb  3.7  /  TBili  0.5  /  DBili  x   /  AST  25  /  ALT  20  /  AlkPhos  63  04-15    PT/INR - ( 15 Apr 2018 05:45 )   PT: 11.4 sec;   INR: 1.04 ratio         PTT - ( 15 Apr 2018 05:45 )  PTT:38.2 sec  Urinalysis Basic - ( 2018 23:38 )    Color: Yellow / Appearance: Clear / S.010 / pH: x  Gluc: x / Ketone: Negative  / Bili: Negative / Urobili: Negative mg/dL   Blood: x / Protein: Negative mg/dL / Nitrite: Negative   Leuk Esterase: Negative / RBC: 0-2 /HPF / WBC 0-2   Sq Epi: x / Non Sq Epi: Occasional / Bacteria: Occasional      RADIOLOGY & ADDITIONAL TESTS:    MEDICATIONS:  MEDICATIONS  (STANDING):  allopurinol 100 milliGRAM(s) Oral two times a day  aspirin  chewable 81 milliGRAM(s) Oral daily  clopidogrel Tablet 75 milliGRAM(s) Oral daily  colchicine 0.6 milliGRAM(s) Oral daily  heparin  Injectable 5000 Unit(s) SubCutaneous every 12 hours  lisinopril 5 milliGRAM(s) Oral daily  simvastatin 40 milliGRAM(s) Oral at bedtime    MEDICATIONS  (PRN):  acetaminophen   Tablet 650 milliGRAM(s) Oral every 6 hours PRN For Temp greater than 38 C (100.4 F)  acetaminophen   Tablet. 650 milliGRAM(s) Oral every 6 hours PRN Moderate Pain (4 - 6)  morphine  - Injectable 2 milliGRAM(s) IV Push every 4 hours PRN Severe Pain (7 - 10)  ondansetron Injectable 4 milliGRAM(s) IV Push every 4 hours PRN Nausea and/or Vomiting
SUBJECTIVE:  S/p cath     OBJECTIVE:  	  MEDICATIONS:  lisinopril 5 milliGRAM(s) Oral daily  aspirin  chewable 81 milliGRAM(s) Oral daily  clopidogrel Tablet 75 milliGRAM(s) Oral daily  simvastatin 40 milliGRAM(s) Oral at bedtime    allopurinol 100 milliGRAM(s) Oral two times a day  colchicine 0.6 milliGRAM(s) Oral daily  heparin  Injectable 5000 Unit(s) SubCutaneous every 12 hours        PHYSICAL EXAM:    T(C): 36.5 (04-18-18 @ 05:49), Max: 36.8 (04-17-18 @ 09:02)  HR: 58 (04-18-18 @ 05:49) (56 - 66)  BP: 100/50 (04-18-18 @ 05:49) (96/54 - 128/64)  RR: 17 (04-18-18 @ 05:49) (17 - 20)  SpO2: 97% (04-18-18 @ 05:49) (94% - 98%)  Wt(kg): --    I&O's Summary    17 Apr 2018 07:01  -  18 Apr 2018 07:00  --------------------------------------------------------  IN: 240 mL / OUT: 353 mL / NET: -113 mL      Appearance: Normal	  HEENT:   Normal oral mucosa, PERRL, EOMI	  Lymphatic: No lymphadenopathy  Cardiovascular: Normal S1 S2,  Respiratory: Lungs clear to auscultation	  Gastrointestinal:  Soft, Non-tender, + BS	Right groin soft no hematoma  Skin: No rashes, No ecchymoses, No cyanosis  Neurologic: Non-focal  Extremities: Normal range of motion, No clubbing, cyanosis or edema  Vascular: Peripheral pulses palpable 2+ bilaterally    TELEMETRY: 	    ECG:  Sinus yung no arrhythmias  EKG Sinus yung wavy baseline, Wnl  RADIOLOGY:   DIAGNOSTIC TESTING:  [x ] Echocardiogram:  Left Ventricle: The left ventricular internal cavity size is normal.  Global LV systolic function was normal. Left ventricular ejection   fraction, by visual estimation, is 65 to 70%. Spectral Doppler shows   normal pattern of LV diastolic filling.  Right Ventricle: Normal right ventricular size and function.  Left Atrium: The left atrium is normal in size.  Right Atrium: The right atrium is normal in size.  Pericardium: There is no evidence of pericardial effusion.  Mitral Valve: Mitral leaflet mobility is normal. Trace mitral valve   regurgitation is seen.  Tricuspid Valve: Mild tricuspid regurgitation is visualized.  Aortic Valve: The aortic valve is trileaflet. Sclerotic aortic valve with   normal opening. Peak transaortic gradient equals 9.0 mmHg, mean   transaortic gradient equals 4.0 mmHg, the calculated aortic valve area   equals 2.71 cm² by the continuity equation consistent with normally   opening aortic valve. Mild aortic valve regurgitation is seen. The aorta   is dilated measuring 3.8 cm at the level of the sinus. The ascending   aorta is also dilated measuring 4.3 cm. Sclerotic aortic valve with   normal opening.  Pulmonic Valve: Mild pulmonic valve regurgitation.  Aorta: The aorta a the the level of the sinus and the ascending aorta is   measuring.  Pulmonary Artery: The main pulmonary artery is normal in size.  Venous: The inferior vena cava was normal sized, with respiratory size   variation greater than 50%.    [ ]  Catheterization:  Full report pending  OTHER: 	    LABS:	 	    CARDIAC MARKERS:                         12.4   7.3   )-----------( 177      ( 18 Apr 2018 06:05 )             37.9     04-18    141  |  104  |  17.0  ----------------------------<  87  4.2   |  29.0  |  1.21    Ca    8.4<L>      18 Apr 2018 06:05    79 y/o male pt with PMHx/o CAD s/p PCI x 4 at [facility] 2016 as well as in 2000 and 2008, PVD, HTN, HLD, and gout presents with chest pain,negative enzymes found to have Lad lesion and S/P PCI with RENATE X1 mLAD via right groin with angioseal closure.    CAD s/p lad stent  to be d/jessy today to follow up with Dr. Hernandez  Importance of taking asa and plavix discussed with patient  Low saturated fat diet discussed  Simvastatin for AShd and hyperlipidemia
Cardiology Follow-up    SARAHY MCCANN was seen and examined. He admits to having chest pain and SOB with walking in the room. He denies palpitations, orthopnea, PND or abdominal pain.    PROBLEM LIST:  Chronic gout without tophus, unspecified cause, unspecified site  Essential hypertension  High cholesterol  Elevated lipase  Anemia, unspecified type  Elevated BUN  ACS (acute coronary syndrome)    PAST MEDICAL HISTORY:  Myocardial infarction, unspecified MI type, unspecified artery  Coronary artery disease involving native coronary artery of native heart without angina pectoris  Gout  High cholesterol  Hypertension    PAST SURGICAL HISTORY:  H/O gastric bypass  Gallbladder pain  Acute appendicitis, unspecified acute appendicitis type  Acute carpal tunnel syndrome, unspecified laterality  PVD (peripheral vascular disease)    MEDICATIONS  (STANDING):  allopurinol 100 milliGRAM(s) Oral two times a day  aspirin  chewable 81 milliGRAM(s) Oral daily  clopidogrel Tablet 75 milliGRAM(s) Oral daily  colchicine 0.6 milliGRAM(s) Oral daily  heparin  Injectable 5000 Unit(s) SubCutaneous every 12 hours  isosorbide   mononitrate ER Tablet (IMDUR) 30 milliGRAM(s) Oral daily  lisinopril 5 milliGRAM(s) Oral daily  simvastatin 40 milliGRAM(s) Oral at bedtime    MEDICATIONS  (PRN):  acetaminophen   Tablet 650 milliGRAM(s) Oral every 6 hours PRN For Temp greater than 38 C (100.4 F)  acetaminophen   Tablet 650 milliGRAM(s) Oral every 6 hours PRN headache  acetaminophen   Tablet. 650 milliGRAM(s) Oral every 6 hours PRN Moderate Pain (4 - 6)  morphine  - Injectable 2 milliGRAM(s) IV Push every 4 hours PRN Severe Pain (7 - 10)  ondansetron Injectable 4 milliGRAM(s) IV Push every 4 hours PRN Nausea and/or Vomiting    ALLERGIES:  No Known Allergies    I&O's Summary    2018 07:01  -  2018 07:00  --------------------------------------------------------  IN: 237 mL / OUT: 0 mL / NET: 237 mL      PHYSICAL EXAM:  T(F): 98.5 (18 @ 05:46), Max: 99.2 (18 @ 11:40)  HR: 62 (18 @ 05:46) (56 - 67)  BP: 98/63 (18 @ 05:46) (98/63 - 128/68)  RR: 16 (18 @ 05:46) (16 - 20)  SpO2: 96% (18 @ 05:46) (96% - 97%)  Wt(kg): --  Weight (kg): [phone] ( @ 21:18)  Telemetry: Sinus, HR 40s-80s, occasional APCs  General: comfortable, in NAD  Heart: +S1S2, RRR, 1/6 systolic murmur at the LLSB, no rubs, no gallops  Lungs: clear to auscultation bilaterally, no wheezes, no rales, no rhonchi  Abdomen: soft, non-tender, non-distended, + bowel sounds  Extremities: no clubbing, no cyanosis, no edema  Vascular: significant varicose veins noted bilaterally  Neuro: A&O x3      LABS:    Troponin T, Serum: <0.01 ng/mL (04-15 @ 05:46)  Troponin T, Serum: <0.01 ng/mL ( @ 22:42)        CBC:            11.8   5.9   >-----------< 176    04- @ 06:14            36.6               12.6   5.3   >-----------< 168    04-16 @ 05:57            39.1               12.7   6.4   >-----------< 151    04-15 @ 05:45            39.1               13.1   8.5   >-----------< 167    - @ 22:42            40.4       CHEMISTRY:   142   |  105    |  21.0   ----------------------------< 103     04-17 @ 06:14    4.3   |  25.0   |  1.16     eGFR non-  60     eGFR   70      140   |  103    |  21.0   ----------------------------< 101     04 @ 05:57    4.4   |  26.0   |  1.11     eGFR non-  63     eGFR   73      142   |  107    |  20.0   ----------------------------< 100     04-15 @ 05:46    4.4   |  24.0   |  1.04     eGFR non-  68     eGFR   79      139   |  102    |  23.0   ----------------------------< 78      04 @ 22:42    4.2   |  27.0   |  1.08     eGFR non-  65     eGFR   76         TPro --    / Alb 3.7   / TBili 0.5   / DBili --    / AST 25    / ALT 20    / AlkPhos --     04-15 @ 05:46  TPro --    / Alb 4.1   / TBili 0.4   / DBili --    / AST 28    / ALT 21    / AlkPhos --      @ 22:42      URINALYSIS: ( @ 23:38)  Color Yellow / pH 6.0   / Sp Gr 1.010 / LE <<16 / Nit Negative / Prot Negative / Blood Trace / Uro Negative / WBC 0-2   / RBC 0-2   / Bacteria Occasional    RADIOLOGY & ADDITIONAL TESTS:	  CTA:  No pulmonary embolism or other acute findings.    Echo:   1. Normal left ventricular internal cavity size.   2. Normal global left ventricular systolic function.   3. Left ventricular ejection fraction, by visual estimation, is 65 to   70%.   4. Normal right ventricular size and function.   5. The left atrium is normal in size.   6. The right atrium is normal in size.   7. Sclerotic aortic valve with normal opening.   8. Mild aortic regurgitation.   9. Trace mitral valve regurgitation.  10. The ascending aorta is dilated measuring 4.3 cm.  11. The main pulmonary artery is normal in size.  12. There is no evidence of pericardial effusion.      ASSESSMENT:  SARAHY MCCANN is a 78y old male with a history of CAD s/p multiple PCI, essential hypertension, hyperlipidemia and gastric bypass who presented with chest pain and SOB.    Please permit me to suggest the followin. Cardiac enzymes were negative x2 and CTA was negative for PE. I think he is having unstable angina. Awaiting cardiac cath this afternoon by Dr. Hernandez.  2. Echo demonstrated hyperdynamic LV systolic function. I would ideally order a beta blocker but he occasionally goes down to the 40s at baseline.  3. Continue aspirin, plavix, lisinopril and Imdur. Should he receive a stent today, Imdur can be stopped.
Cardiology Follow-up    SARAHY MCCANN was seen and examined. He continues to have intermittent chest pain. I walked with him and by the time he reached the doorway, he developed 6/10 chest pressure and SOB. The chest pain resolved after sitting for a minute and the SOB lasted a few minutes more. He admits to occasional palpitations. He denies orthopnea, PND or abdominal pain.    PROBLEM LIST:  Chronic gout without tophus, unspecified cause, unspecified site  Essential hypertension  High cholesterol  Elevated lipase  Anemia, unspecified type  Elevated BUN  ACS (acute coronary syndrome)    PAST MEDICAL HISTORY:  Myocardial infarction, unspecified MI type, unspecified artery  Coronary artery disease involving native coronary artery of native heart without angina pectoris  Gout  High cholesterol  Hypertension    PAST SURGICAL HISTORY:  H/O gastric bypass  Gallbladder pain  Acute appendicitis, unspecified acute appendicitis type  Acute carpal tunnel syndrome, unspecified laterality  PVD (peripheral vascular disease)    MEDICATIONS  (STANDING):  allopurinol 100 milliGRAM(s) Oral two times a day  aspirin  chewable 81 milliGRAM(s) Oral daily  clopidogrel Tablet 75 milliGRAM(s) Oral daily  colchicine 0.6 milliGRAM(s) Oral daily  heparin  Injectable 5000 Unit(s) SubCutaneous every 12 hours  lisinopril 5 milliGRAM(s) Oral daily  simvastatin 40 milliGRAM(s) Oral at bedtime    MEDICATIONS  (PRN):  acetaminophen   Tablet 650 milliGRAM(s) Oral every 6 hours PRN For Temp greater than 38 C (100.4 F)  acetaminophen   Tablet. 650 milliGRAM(s) Oral every 6 hours PRN Moderate Pain (4 - 6)  morphine  - Injectable 2 milliGRAM(s) IV Push every 4 hours PRN Severe Pain (7 - 10)  ondansetron Injectable 4 milliGRAM(s) IV Push every 4 hours PRN Nausea and/or Vomiting    ALLERGIES:  No Known Allergies    I&O's Summary    15 Apr 2018 07:01  -  2018 07:00  --------------------------------------------------------  IN: 1080 mL / OUT: 830 mL / NET: 250 mL      PHYSICAL EXAM:  T(F): 99.2 (18 @ 11:40), Max: 99.2 (18 @ 11:40)  HR: 56 (18 @ 11:40) (54 - 69)  BP: 117/58 (18 @ 11:40) (110/60 - 121/66)  RR: 20 (18 @ 11:40) (18 - 20)  SpO2: 95% (18 @ 06:16) (95% - 98%)  Wt(kg): --  Weight (kg): 126.001172602 ( @ 21:18)  Telemetry: Sinus, HR 50s-70s  General: comfortable, in NAD  Heart: +S1S2, RRR, 1/6 systolic murmur at the LLSB, no rubs, no gallops  Lungs: clear to auscultation bilaterally, no wheezes, no rales, no rhonchi  Abdomen: soft, non-tender, non-distended, + bowel sounds  Extremities: no clubbing, no cyanosis, trace bilateral lower extremity edema  Vascular: varicose veins noted bilaterally  Neuro: A&O x3    LABS:    Troponin T, Serum: <0.01 ng/mL (04-15 @ 05:46)  Troponin T, Serum: <0.01 ng/mL ( @ 22:42)        CBC:            12.6   5.3   >-----------< 168     @ 05:57            39.1               12.7   6.4   >-----------< 151    04-15 @ 05:45            39.1               13.1   8.5   >-----------< 167     @ 22:42            40.4       CHEMISTRY:   140   |  103    |  21.0   ----------------------------< 101      @ 05:57    4.4   |  26.0   |  1.11     eGFR non-  63     eGFR   73      142   |  107    |  20.0   ----------------------------< 100     04-15 @ 05:46    4.4   |  24.0   |  1.04     eGFR non-  68     eGFR   79      139   |  102    |  23.0   ----------------------------< 78       @ 22:42    4.2   |  27.0   |  1.08     eGFR non-  65     eGFR   76         TPro --    / Alb 3.7   / TBili 0.5   / DBili --    / AST 25    / ALT 20    / AlkPhos --     04-15 @ 05:46  TPro --    / Alb 4.1   / TBili 0.4   / DBili --    / AST 28    / ALT 21    / AlkPhos --      @ 22:42    PT/INR - ( 15 Apr 2018 05:45 )   PT: 11.4 sec;   INR: 1.04 ratio         PTT - ( 15 Apr 2018 05:45 )  PTT:38.2 sec  URINALYSIS: ( @ 23:38)  Color Yellow / pH 6.0   / Sp Gr 1.010 / LE <<16 / Nit Negative / Prot Negative / Blood Trace / Uro Negative / WBC 0-2   / RBC 0-2   / Bacteria Occasional    RADIOLOGY & ADDITIONAL TESTS:  Venous duplex:  Right popliteal fossa Baker's cyst measures 4.8 x 6.5 x 2.2 cm.  No evidence of bilateral lower extremity deep venous thrombosis.      ASSESSMENT:  SARAHY MCCANN is a 78y old male with a history of CAD s/p multiple PCI, essential hypertension, hyperlipidemia and gastric bypass who presented with chest pain and SOB.      Please permit me to suggest the followin. Cardiac enzymes are negative x2. A CTA was just performed to rule out PE. I personally think this is going to be negative. If it is negative, I will arrange for a cardiac catheterization to be performed tomorrow.   2. Awaiting an Echo to evaluate LV function.  3. Continue aspirin, plavix and lisinopril. No beta blocker secondary to bradycardia. I will start Imdur 30 mg daily.
Cardiology Follow-up    SARAHY MCCANN was seen and examined. No events overnight. He is s/p RENATE to the mid LAD yesterday. He had a small hematoma in his right groin that was compressed by his nurse this morning. He denies any chest pain, SOB, palpitations, orthopnea, PND, abdominal pain or groin pain.     PROBLEM LIST:  Chronic gout without tophus, unspecified cause, unspecified site  Essential hypertension  High cholesterol  Elevated lipase  Anemia, unspecified type  Elevated BUN  ACS (acute coronary syndrome)    PAST MEDICAL HISTORY:  Myocardial infarction, unspecified MI type, unspecified artery  Coronary artery disease involving native coronary artery of native heart without angina pectoris  Gout  High cholesterol  Hypertension    PAST SURGICAL HISTORY:  H/O gastric bypass  Gallbladder pain  Acute appendicitis, unspecified acute appendicitis type  Acute carpal tunnel syndrome, unspecified laterality  PVD (peripheral vascular disease)    MEDICATIONS  (STANDING):  allopurinol 100 milliGRAM(s) Oral two times a day  aspirin  chewable 81 milliGRAM(s) Oral daily  clopidogrel Tablet 75 milliGRAM(s) Oral daily  colchicine 0.6 milliGRAM(s) Oral daily  heparin  Injectable 5000 Unit(s) SubCutaneous every 12 hours  lisinopril 5 milliGRAM(s) Oral daily  simvastatin 40 milliGRAM(s) Oral at bedtime    MEDICATIONS  (PRN):  acetaminophen   Tablet 650 milliGRAM(s) Oral every 6 hours PRN For Temp greater than 38 C (100.4 F)  acetaminophen   Tablet 650 milliGRAM(s) Oral every 6 hours PRN headache  acetaminophen   Tablet. 650 milliGRAM(s) Oral every 6 hours PRN Moderate Pain (4 - 6)  morphine  - Injectable 2 milliGRAM(s) IV Push every 4 hours PRN Severe Pain (7 - 10)  ondansetron Injectable 4 milliGRAM(s) IV Push every 4 hours PRN Nausea and/or Vomiting    ALLERGIES:  No Known Allergies    I&O's Summary    2018 07:01  -  2018 07:00  --------------------------------------------------------  IN: 240 mL / OUT: 353 mL / NET: -113 mL      PHYSICAL EXAM:  T(F): 97.7 (04-18-18 @ 05:49), Max: 97.7 (18 @ 20:21)  HR: 58 (18 @ 05:49) (56 - 61)  BP: 100/50 (18 @ 05:49) (96/54 - 128/64)  RR: 17 (18 @ 05:49) (17 - 20)  SpO2: 97% (18 @ 05:49) (94% - 98%)  Wt(kg): --  Weight (kg): 131.542 ( @ 14:44)  Telemetry: Sinus, HR 40s-70s  General: comfortable, in NAD  Heart: +S1S2, RRR, 1/6 systolic murmur at the LLSB, no rubs, no gallops  Lungs: clear to auscultation bilaterally, no wheezes, no rales, no rhonchi  Abdomen: soft, non-tender, non-distended, + bowel sounds  Extremities: no clubbing, no cyanosis, no edema, + ecchymosis but no hematoma in the right groin, 2+ right femoral and DP pulses  Vascular: significant varicose veins noted bilaterally  Neuro: A&O x3      LABS:          CBC:            12.4   7.3   >-----------< 177    04-18 @ 06:05            37.9               11.8   5.9   >-----------< 176    04-17 @ 06:14            36.6               12.6   5.3   >-----------< 168    04-16 @ 05:57            39.1               12.7   6.4   >-----------< 151    04-15 @ 05:45            39.1               13.1   8.5   >-----------< 167    04-14 @ 22:42            40.4       CHEMISTRY:   141   |  104    |  17.0   ----------------------------< 87      04-18 @ 06:05    4.2   |  29.0   |  1.21     eGFR non-  57     eGFR   66      142   |  105    |  21.0   ----------------------------< 103     04-17 @ 06:14    4.3   |  25.0   |  1.16     eGFR non-  60     eGFR   70      140   |  103    |  21.0   ----------------------------< 101     0416 @ 05:57    4.4   |  26.0   |  1.11     eGFR non-  63     eGFR   73      142   |  107    |  20.0   ----------------------------< 100     04-15 @ 05:46    4.4   |  24.0   |  1.04     eGFR non-  68     eGFR   79      139   |  102    |  23.0   ----------------------------< 78      14 @ 22:42    4.2   |  27.0   |  1.08     eGFR non-  65     eGFR   76         TPro --    / Alb 3.7   / TBili 0.5   / DBili --    / AST 25    / ALT 20    / AlkPhos --     04-15 @ 05:46  TPro --    / Alb 4.1   / TBili 0.4   / DBili --    / AST 28    / ALT 21    / AlkPhos --      @ 22:42    PT/INR - ( 2018 06:05 )   PT: 11.1 sec;   INR: 1.01 ratio         PTT - ( 2018 06:05 )  PTT:29.6 sec  URINALYSIS: ( @ 23:38)  Color Yellow / pH 6.0   / Sp Gr 1.010 / LE <<16 / Nit Negative / Prot Negative / Blood Trace / Uro Negative / WBC 0-2   / RBC 0-2   / Bacteria Occasional    RADIOLOGY & ADDITIONAL TESTS:	  Echo:   1. Normal left ventricular internal cavity size.   2. Normal global left ventricular systolic function.   3. Left ventricular ejection fraction, by visual estimation, is 65 to   70%.   4. Normal right ventricular size and function.   5. The left atrium is normal in size.   6. The right atrium is normal in size.   7. Sclerotic aortic valve with normal opening.   8. Mild aortic regurgitation.   9. Trace mitral valve regurgitation.  10. The ascending aorta is dilated measuring 4.3 cm.  11. The main pulmonary artery is normal in size.  12. There is no evidence of pericardial effusion.  	  ASSESSMENT:  SARAHY MCCANN is a 78y old male with a history of CAD s/p multiple PCI, essential hypertension, hyperlipidemia and gastric bypass who presented with chest pain and SOB.    Please permit me to suggest the followin. The patient is s/p RENATE to the mid LAD. He is no longer complaining of chest pain or SOB.  2. Continue aspirin, plavix and lisinopril. Imdur was discontinued. No beta blockers secondary to bradycardia.  3. The patient is cleared from a cardiac viewpoint for discharge home.
Nurse Practitioner Progress note:     INTERVAL HISTORY: 78 year old male p/w chest pain and SOB.    MEDICATIONS:  lisinopril 5 milliGRAM(s) Oral daily  acetaminophen   Tablet 650 milliGRAM(s) Oral every 6 hours PRN  acetaminophen   Tablet 650 milliGRAM(s) Oral every 6 hours PRN  acetaminophen   Tablet. 650 milliGRAM(s) Oral every 6 hours PRN  morphine  - Injectable 2 milliGRAM(s) IV Push every 4 hours PRN  ondansetron Injectable 4 milliGRAM(s) IV Push every 4 hours PRN  allopurinol 100 milliGRAM(s) Oral two times a day  colchicine 0.6 milliGRAM(s) Oral daily  simvastatin 40 milliGRAM(s) Oral at bedtime  aspirin  chewable 81 milliGRAM(s) Oral daily  clopidogrel Tablet 75 milliGRAM(s) Oral daily  heparin  Injectable 5000 Unit(s) SubCutaneous every 12 hours      TELEMETRY: SB    T(C): 36.8 (04-17-18 @ 09:02), Max: 37.2 (04-16-18 @ 19:31)  HR: 59 (04-17-18 @ 17:15) (56 - 67)  BP: 110/55 (04-17-18 @ 17:15) (96/54 - 128/68)  RR: 20 (04-17-18 @ 17:15) (16 - 20)  SpO2: 98% (04-17-18 @ 17:15) (95% - 98%)  Wt(kg): --    PHYSICAL EXAM:  Appearance: Normal	  HEENT:   Normal oral mucosa, PERRL  Cardiovascular: Normal S1 S2, No JVD, No murmurs, No edema  Respiratory: Lungs clear to auscultation	  Psychiatry: A & O x 3, Mood & affect appropriate  Gastrointestinal:  Soft, Non-tender, + BS	  Skin: No rashes, No ecchymoses, No cyanosis  Neurologic: Non-focal, A&O X3.  No neuro deficits  Extremities: Normal range of motion, No clubbing, cyanosis or edema  Vascular: Peripheral pulses palpable 2+ bilaterally  Procedure Site: Right groin site benign.  No hematoma/ecchymosis. Scant amt bloody drainage on dressing.     12 lead EKG:  	SB no acute changes    LABS:	 	                            11.8   5.9   )-----------( 176      ( 17 Apr 2018 06:14 )             36.6     04-17    142  |  105  |  21.0<H>  ----------------------------<  103  4.3   |  25.0  |  1.16    Ca    8.1<L>      17 Apr 2018 06:14      PROCEDURE RESULTS: S/P PCI with RENATE X1 mLAD via right groin with angioseal closure.    ASSESSMENT/PLAN: 	  -Groin precautions  -Bedrest X2h  -Resume home meds  -D/C Imdur  -Follow up with Dr. Hernandez  -Check labs/EKG/site check in AM  -Probable discharge in AM
SARAHY MCCANN    [id number]    78y      Male    Patient is a 78y old  Male who presents with a chief complaint of chest pain (14 Apr 2018 23:32)      INTERVAL HPI/OVERNIGHT EVENTS:    Patient had  no more chest pressure or sob, he denies fever, chills, chest pain, nausea.      REVIEW OF SYSTEMS:    CONSTITUTIONAL: No fever, has fatigue  RESPIRATORY: No cough, No shortness of breath  CARDIOVASCULAR: No chest pain, palpitations  GASTROINTESTINAL: No abdominal, No nausea, vomiting  NEUROLOGICAL: No headaches,  loss of strength.  MISCELLANEOUS: No joint swelling or pain       Vital Signs Last 24 Hrs  T(C): 36.8 (17 Apr 2018 09:02), Max: 37.2 (16 Apr 2018 19:31)  T(F): 98.3 (17 Apr 2018 09:02), Max: 98.9 (16 Apr 2018 19:31)  HR: 57 (17 Apr 2018 14:45) (57 - 67)  BP: 96/54 (17 Apr 2018 14:45) (96/54 - 128/68)  RR: 20 (17 Apr 2018 14:45) (16 - 20)  SpO2: 95% (17 Apr 2018 14:45) (95% - 97%)    PHYSICAL EXAM:    GENERAL: Elderly male looking comfortable   HEENT: PERRL, +EOMI  NECK: soft, Supple, No JVD,   CHEST/LUNG: Decrease air entry bilaterally; No wheezing  HEART: S1S2+, Regular rate and rhythm; No murmurs  ABDOMEN: Soft, Nontender, Nondistended; Bowel sounds present  EXTREMITIES:  1+ Peripheral Pulses, No edema  SKIN: No rashes or lesions  NEURO: AAOX3, no focal deficits, no motor r sensory loss  PSYCH: normal mood      LABS:                        11.8   5.9   )-----------( 176      ( 17 Apr 2018 06:14 )             36.6     04-17    142  |  105  |  21.0<H>  ----------------------------<  103  4.3   |  25.0  |  1.16    Ca    8.1<L>      17 Apr 2018 06:14              I&O's Summary    16 Apr 2018 07:01  -  17 Apr 2018 07:00  --------------------------------------------------------  IN: 237 mL / OUT: 0 mL / NET: 237 mL    17 Apr 2018 07:01  -  17 Apr 2018 15:43  --------------------------------------------------------  IN: 0 mL / OUT: 3 mL / NET: -3 mL        MEDICATIONS  (STANDING):  allopurinol 100 milliGRAM(s) Oral two times a day  aspirin  chewable 81 milliGRAM(s) Oral daily  clopidogrel Tablet 75 milliGRAM(s) Oral daily  colchicine 0.6 milliGRAM(s) Oral daily  heparin  Injectable 5000 Unit(s) SubCutaneous every 12 hours  isosorbide   mononitrate ER Tablet (IMDUR) 30 milliGRAM(s) Oral daily  lisinopril 5 milliGRAM(s) Oral daily  simvastatin 40 milliGRAM(s) Oral at bedtime    MEDICATIONS  (PRN):  acetaminophen   Tablet 650 milliGRAM(s) Oral every 6 hours PRN For Temp greater than 38 C (100.4 F)  acetaminophen   Tablet 650 milliGRAM(s) Oral every 6 hours PRN headache  acetaminophen   Tablet. 650 milliGRAM(s) Oral every 6 hours PRN Moderate Pain (4 - 6)  morphine  - Injectable 2 milliGRAM(s) IV Push every 4 hours PRN Severe Pain (7 - 10)  ondansetron Injectable 4 milliGRAM(s) IV Push every 4 hours PRN Nausea and/or Vomiting
SARAHY MCCANN    [id number]    78y      Male    Patient is a 78y old  Male who presents with a chief complaint of chest pain (2018 23:32)      INTERVAL HPI/OVERNIGHT EVENTS:    Patient had chest pressure and sob with little exertion today, better with rest, he denies fever, chills, chest pain, nausea.      REVIEW OF SYSTEMS:    CONSTITUTIONAL: No fever, has fatigue  RESPIRATORY: No cough, No shortness of breath  CARDIOVASCULAR: No chest pain, palpitations  GASTROINTESTINAL: No abdominal, No nausea, vomiting  NEUROLOGICAL: No headaches,  loss of strength.  MISCELLANEOUS: No joint swelling or pain       Vital Signs Last 24 Hrs  T(C): 37.3 (2018 11:40), Max: 37.3 (2018 11:40)  T(F): 99.2 (2018 11:40), Max: 99.2 (2018 11:40)  HR: 56 (2018 11:40) (54 - 69)  BP: 117/58 (2018 11:40) (110/60 - 121/66)  RR: 20 (2018 11:40) (18 - 20)  SpO2: 95% (2018 06:16) (95% - 98%)    PHYSICAL EXAM:    GENERAL: Elderly male looking comfortable   HEENT: PERRL, +EOMI  NECK: soft, Supple, No JVD,   CHEST/LUNG: Clear to auscultate bilaterally; No wheezing  HEART: S1S2+, Regular rate and rhythm; No murmurs  ABDOMEN: Soft, Nontender, Nondistended; Bowel sounds present  EXTREMITIES:  1+ Peripheral Pulses, No edema  SKIN: No rashes or lesions  NEURO: AAOX3, no focal deficits, no motor r sensory loss  PSYCH: normal mood      LABS:                        12.6   5.3   )-----------( 168      ( 2018 05:57 )             39.1     04-16    140  |  103  |  21.0<H>  ----------------------------<  101  4.4   |  26.0  |  1.11    Ca    8.3<L>      2018 05:57  Phos  3.9     04-15  Mg     2.2     04-15    TPro  5.9<L>  /  Alb  3.7  /  TBili  0.5  /  DBili  x   /  AST  25  /  ALT  20  /  AlkPhos  63  04-15    PT/INR - ( 15 Apr 2018 05:45 )   PT: 11.4 sec;   INR: 1.04 ratio         PTT - ( 15 Apr 2018 05:45 )  PTT:38.2 sec  Urinalysis Basic - ( 2018 23:38 )    Color: Yellow / Appearance: Clear / S.010 / pH: x  Gluc: x / Ketone: Negative  / Bili: Negative / Urobili: Negative mg/dL   Blood: x / Protein: Negative mg/dL / Nitrite: Negative   Leuk Esterase: Negative / RBC: 0-2 /HPF / WBC 0-2   Sq Epi: x / Non Sq Epi: Occasional / Bacteria: Occasional          I&O's Summary    15 Apr 2018 07:01  -  2018 07:00  --------------------------------------------------------  IN: 1080 mL / OUT: 830 mL / NET: 250 mL        MEDICATIONS  (STANDING):  allopurinol 100 milliGRAM(s) Oral two times a day  aspirin  chewable 81 milliGRAM(s) Oral daily  clopidogrel Tablet 75 milliGRAM(s) Oral daily  colchicine 0.6 milliGRAM(s) Oral daily  heparin  Injectable 5000 Unit(s) SubCutaneous every 12 hours  isosorbide   mononitrate ER Tablet (IMDUR) 30 milliGRAM(s) Oral daily  lisinopril 5 milliGRAM(s) Oral daily  simvastatin 40 milliGRAM(s) Oral at bedtime    MEDICATIONS  (PRN):  acetaminophen   Tablet 650 milliGRAM(s) Oral every 6 hours PRN For Temp greater than 38 C (100.4 F)  acetaminophen   Tablet 650 milliGRAM(s) Oral every 6 hours PRN headache  acetaminophen   Tablet. 650 milliGRAM(s) Oral every 6 hours PRN Moderate Pain (4 - 6)  morphine  - Injectable 2 milliGRAM(s) IV Push every 4 hours PRN Severe Pain (7 - 10)  ondansetron Injectable 4 milliGRAM(s) IV Push every 4 hours PRN Nausea and/or Vomiting

## 2018-04-18 NOTE — DISCHARGE NOTE ADULT - PATIENT PORTAL LINK FT
You can access the [facility] Portal, offered by [facility], by registering with the following website: [email or web address]

## 2018-04-18 NOTE — DISCHARGE NOTE ADULT - CARE PROVIDER_API CALL
Dr Wan,   Cardiology in 2 weeks, please call his office and get an appiontment  Phone: (   )    -  Fax: (   )    -    Dr Jeff,   PMD in 1 week, please call his office and get an appiontment.  Phone: (   )    -  Fax: (   )    -

## 2018-05-23 PROCEDURE — 85045 AUTOMATED RETICULOCYTE COUNT: CPT

## 2018-05-23 PROCEDURE — 80048 BASIC METABOLIC PNL TOTAL CA: CPT

## 2018-05-23 PROCEDURE — C1887: CPT

## 2018-05-23 PROCEDURE — 80307 DRUG TEST PRSMV CHEM ANLYZR: CPT

## 2018-05-23 PROCEDURE — C1760: CPT

## 2018-05-23 PROCEDURE — 83880 ASSAY OF NATRIURETIC PEPTIDE: CPT

## 2018-05-23 PROCEDURE — 93005 ELECTROCARDIOGRAM TRACING: CPT

## 2018-05-23 PROCEDURE — 71275 CT ANGIOGRAPHY CHEST: CPT

## 2018-05-23 PROCEDURE — 93306 TTE W/DOPPLER COMPLETE: CPT

## 2018-05-23 PROCEDURE — 93970 EXTREMITY STUDY: CPT

## 2018-05-23 PROCEDURE — 71046 X-RAY EXAM CHEST 2 VIEWS: CPT

## 2018-05-23 PROCEDURE — 83550 IRON BINDING TEST: CPT

## 2018-05-23 PROCEDURE — 80061 LIPID PANEL: CPT

## 2018-05-23 PROCEDURE — 80053 COMPREHEN METABOLIC PANEL: CPT

## 2018-05-23 PROCEDURE — 83690 ASSAY OF LIPASE: CPT

## 2018-05-23 PROCEDURE — C9600: CPT | Mod: LD

## 2018-05-23 PROCEDURE — 82746 ASSAY OF FOLIC ACID SERUM: CPT

## 2018-05-23 PROCEDURE — 83036 HEMOGLOBIN GLYCOSYLATED A1C: CPT

## 2018-05-23 PROCEDURE — C1874: CPT

## 2018-05-23 PROCEDURE — 85027 COMPLETE CBC AUTOMATED: CPT

## 2018-05-23 PROCEDURE — 84443 ASSAY THYROID STIM HORMONE: CPT

## 2018-05-23 PROCEDURE — C1725: CPT

## 2018-05-23 PROCEDURE — 99285 EMERGENCY DEPT VISIT HI MDM: CPT

## 2018-05-23 PROCEDURE — C1769: CPT

## 2018-05-23 PROCEDURE — 85610 PROTHROMBIN TIME: CPT

## 2018-05-23 PROCEDURE — 82728 ASSAY OF FERRITIN: CPT

## 2018-05-23 PROCEDURE — 84466 ASSAY OF TRANSFERRIN: CPT

## 2018-05-23 PROCEDURE — 83735 ASSAY OF MAGNESIUM: CPT

## 2018-05-23 PROCEDURE — 81001 URINALYSIS AUTO W/SCOPE: CPT

## 2018-05-23 PROCEDURE — 36415 COLL VENOUS BLD VENIPUNCTURE: CPT

## 2018-05-23 PROCEDURE — 84484 ASSAY OF TROPONIN QUANT: CPT

## 2018-05-23 PROCEDURE — 85730 THROMBOPLASTIN TIME PARTIAL: CPT

## 2018-05-23 PROCEDURE — 93458 L HRT ARTERY/VENTRICLE ANGIO: CPT

## 2018-05-23 PROCEDURE — 84100 ASSAY OF PHOSPHORUS: CPT

## 2018-11-01 NOTE — H&P ADULT - ENMT
11/01/18 1130   Living Environment   Lives With parent(s)   Home Accessibility no concerns   Transportation Available family or friend will provide   Functional Level Prior   Dominant Hand right   Usual Activity Tolerance good   Current Activity Tolerance fair   Regular Exercise no   Activity/Exercise/Self-Care Comment Pt diagnosed with ALL with february 2018 - previously very active with soccer and tennis prior to diagnosis.    Age appropriate Yes   Ambulation 0-->independent   Transferring 0-->independent   Toileting 0-->independent   Bathing 0-->independent   Dressing 0-->independent   Cognition 0 - no cognition issues reported   Fall history within last six months no   Prior Functional Level Comment Pt previously IND with all mobility.    General Information   Onset of Illness/Injury or Date of Surgery - Date 10/29/18   Referring Physician Manjinder Paniagua MD   Pertinent History of Current Problem (include personal factors and/or comorbidities that impact the POC) 20 year old Artemio Nino in Pediatric Cardiology Clinic at the [facility] on 10/22/18 for evaluation of cardiac status in preparation for BMT.  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 He was diagnosed with this in 2/2018 during evaluation for nonexertional syncope. He began induction chemotherapy per [id number] on 2/15/18. 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 Artemio reports that he has only fainted once, after a receiving a shot.  He denies feeling like he is going to faint, he denies palpitations.  He is otherwise a healthy young man who, before the diagnosis of ALL, was active in soccer and tennis and kept up with his peers.  He was evaluated by Dr. Figueroa Leger of [facility] and a 3-day Holter performed that does not appear to have been included in the transferred  records.  Dr. Leger deferred further study of Artemio at that time.   Parent/Caregiver Involvement Attentive to pt needs   General Observations Sleeping in bed - lights off.    General Info Comments Mom, grandma and brother in room.    Cognitive Status Examination   Orientation orientation to person, place and time   Level of Consciousness alert   Follows Commands and Answers Questions 100% of the time   Personal Safety and Judgment intact   Memory intact   Posture    Posture Comments Rounded shoulders.    Range of Motion (ROM)   ROM Comment WFL.   Strength   Strength Comments WFL.   Transfer Skills and Mobility   Bed Mobility Comments IND   Functional Motor Performance-Higher Level Motor Skills   Higher Level Gross Motor Skill Comments IND   Gait   Gait Comments IND   Balance   Balance Comments No balance deficits identified.    General Therapy Interventions   Planned Therapy Interventions Therapeutic Procedures;Therapeutic Activities;Gait Training   Clinical Impression   Criteria for Skilled Therapeutic Interventions Met yes;treatment indicated   PT Diagnosis generalized deconditioning   Functional limitations due to impairments impaired mobility   Clinical Presentation Evolving/Changing   Clinical Presentation Rationale BMT   Clinical Decision Making (Complexity) Low complexity   Therapy Frequency other (see comments)  (1-2x/week)   Predicted Duration of Therapy Intervention (days/wks) 12 weeks   Anticipated Discharge Disposition home w/ assist   Risk & Benefits of therapy have been explained Yes   Patient, Family & other staff in agreement with plan of care Yes   Clinical Impression Comments Patient will benefit from skilled PT intervention during prolonged hospitalization to prevent further reduction in strength and activity tolerance. PT to provide patient with education and guidelines for activity during hospital stay.    Total Evaluation Time   Total Evaluation Time (Minutes) 8      No oral lesions; no gross abnormalities

## 2020-07-21 PROBLEM — I10 ESSENTIAL (PRIMARY) HYPERTENSION: Chronic | Status: ACTIVE | Noted: 2018-04-14

## 2020-07-21 PROBLEM — I21.9 ACUTE MYOCARDIAL INFARCTION, UNSPECIFIED: Chronic | Status: ACTIVE | Noted: 2018-04-14

## 2020-07-21 PROBLEM — I25.10 ATHEROSCLEROTIC HEART DISEASE OF NATIVE CORONARY ARTERY WITHOUT ANGINA PECTORIS: Chronic | Status: ACTIVE | Noted: 2018-04-14

## 2020-07-21 PROBLEM — E78.00 PURE HYPERCHOLESTEROLEMIA, UNSPECIFIED: Chronic | Status: ACTIVE | Noted: 2018-04-14

## 2020-07-21 PROBLEM — M10.9 GOUT, UNSPECIFIED: Chronic | Status: ACTIVE | Noted: 2018-04-14

## 2020-07-23 DIAGNOSIS — Z01.818 ENCOUNTER FOR OTHER PREPROCEDURAL EXAMINATION: ICD-10-CM

## 2020-07-23 PROBLEM — Z00.00 ENCOUNTER FOR PREVENTIVE HEALTH EXAMINATION: Status: ACTIVE | Noted: 2020-07-23

## 2020-07-25 ENCOUNTER — APPOINTMENT (OUTPATIENT)
Dept: DISASTER EMERGENCY | Facility: CLINIC | Age: 80
End: 2020-07-25

## 2020-07-26 LAB — SARS-COV-2 N GENE NPH QL NAA+PROBE: NOT DETECTED

## 2020-07-27 NOTE — H&P PST ADULT - HISTORY OF PRESENT ILLNESS
Narrative: 80 year old male pt with PMH:  CAD s/p PCI x 4 at [facility] 2016 as well as in 2000 and 2008, PVD, HTN, HLD, and gout. He presented to cardiologist with chest pain and SOB and ongoing Angina . Medically therapy was attempted with Ranexa which he developed diarrhea . Toprol was started and stopped due to bradycardia and Imdur and amlodipine was discontinued due to dizziness . He was sent for a NST ( Lexiscan ) that demonstrated a moderate sized area of mild ischemia in the basal and apical inferior walls EF 53 % with non limiting chest pressure post Lexiscan injection.   He was referred for cardiac cath       Symptoms:        Angina (Class):        Ischemic Symptoms:     Heart Failure:        Systolic/Diastolic/Combined:        NYHA Class (within 2 weeks):     Assessment of LVEF (Must be within 6 months):       EF:        Assessed by:        Date:     Prior Cardiac Interventions (LHC, stents, CABG):       PCI's (Date, Stents, Vessels):        CABG (Date, Grafts):     Noninvasive Testing:   Stress Test: Date:        Protocol:        Duration of Exercise:        Symptoms:        EKG Changes:        DTS:        Myocardial Imaging:        Risk Assessment (Low, Medium, High):     Echo (Date, Findings):     Antianginal Therapies:        Beta Blockers:         Calcium Channel Blockers:        Long Acting Nitrates:        Ranexa:     Associated Risk Factors:        Cerebrovascular Disease: N/A       Chronic Lung Disease: N/A       Peripheral Arterial Disease: N/A       Chronic Kidney Disease (if yes, what is GFR): N/A       Uncontrolled Diabetes (if yes, what is HgbA1C or FBS): N/A       Poorly Controlled Hypertension (if yes, what is SBP): N/A       Morbid Obesity (if yes, what is BMI): N/A       History of Recent Ventricular Arrhythmia: N/A       Inability to Ambulate Safely: N/A       Need for Therapeutic Anticoagulation: N/A       Antiplatelet or Contrast Allergy: N/A Narrative: 80 year old male pt with PMH:  CAD s/p PCI x 4 at [facility] 2016 as well as in 2000 and 2008, PVD, HTN, HLD, and gout. He presented to cardiologist with chest pain and SOB and ongoing Angina . Medically therapy was attempted with Ranexa which he developed diarrhea . Toprol was started and stopped due to bradycardia and Imdur and amlodipine was discontinued due to dizziness . He was sent for a NST ( Lexiscan ) that demonstrated a moderate sized area of mild ischemia in the basal and apical inferior walls EF 53 % with non limiting chest pressure post Lexiscan injection.   He was referred for cardiac cath     ASA:  Mallampati:  BRA:      Prior Cardiac Interventions: < from: Cardiac Cath Lab - Adult (04.17.18 @ 15:21) >  LM:   --  LM: Normal.  LAD:   --  Proximal LAD: There was a tubular 0 % stenosis at the site of a  prior stent.  --  Mid LAD: There was a diffuse 75 % stenosis at the site of a prior  stent. In a second lesion, there was a tubular 75 % stenosis.  --  D1: There was a tubular 30 % stenosis at the ostium of the vessel  segment.  CX:   --  Circumflex: Angiography showed mild atherosclerosis with no flow  limiting lesions.  RCA:   --  RCA: Angiography showed minor luminal irregularities with no  flow limiting lesions.  --  RPDA: Angiography showed mild atherosclerosis with no flow limiting  lesions.  COMPLICATIONS: No complications occurred during the cath lab visit.  SUMMARY:  Summary: Addendum 4/18/2018: Case reconfirmed to add Dr. Hernandez as  Interventional Cardiologist and generat Interventional DMS report  INTERVENTIONAL RECOMMENDATIONS: Continue clopidogrel (Plavix), 75 mg, PO,  daily. Continue aspirin, 81 mg, PO, daily.  Prepared and signed by  Prabhu Hernandez MD  Signed 04/18/2018 12:28:27    < end of copied text >      Noninvasive Testing:   Stress Test: Date: 7/2020       Protocol: Nuclear stress test       Duration of Exercise: NA       Symptoms: 5/6 chest pain       EKG Changes: no       DTS:        Myocardial Imaging: moderate sized area of mild ischemia in the basal to apical inferior wall.       Risk Assessment (Low, Medium, High):     Echo (Date, Findings): 7/2020: EF 60-65%. Mild to moderate pulmonic regurgitation. Mild TR. Ascending aorta is enlarged measuring 4.3cm. Stable from 3/2017.    Antianginal Therapies:        Beta Blockers:  no       Calcium Channel Blockers: no       Long Acting Nitrates: no       Ranexa: no

## 2020-07-28 ENCOUNTER — INPATIENT (INPATIENT)
Facility: HOSPITAL | Age: 80
LOS: 0 days | Discharge: HOME | DRG: 287 | End: 2020-07-29
Attending: INTERNAL MEDICINE | Admitting: INTERNAL MEDICINE
Payer: COMMERCIAL

## 2020-07-28 ENCOUNTER — TRANSCRIPTION ENCOUNTER (OUTPATIENT)
Age: 80
End: 2020-07-28

## 2020-07-28 VITALS
HEIGHT: 70 IN | WEIGHT: 237 LBS | OXYGEN SATURATION: 94 % | TEMPERATURE: 98 F | DIASTOLIC BLOOD PRESSURE: 67 MMHG | RESPIRATION RATE: 18 BRPM | HEART RATE: 62 BPM | SYSTOLIC BLOOD PRESSURE: 138 MMHG

## 2020-07-28 DIAGNOSIS — K82.9 DISEASE OF GALLBLADDER, UNSPECIFIED: Chronic | ICD-10-CM

## 2020-07-28 DIAGNOSIS — I73.9 PERIPHERAL VASCULAR DISEASE, UNSPECIFIED: Chronic | ICD-10-CM

## 2020-07-28 DIAGNOSIS — Z98.84 BARIATRIC SURGERY STATUS: Chronic | ICD-10-CM

## 2020-07-28 DIAGNOSIS — G56.00 CARPAL TUNNEL SYNDROME, UNSPECIFIED UPPER LIMB: Chronic | ICD-10-CM

## 2020-07-28 DIAGNOSIS — K35.80 UNSPECIFIED ACUTE APPENDICITIS: Chronic | ICD-10-CM

## 2020-07-28 DIAGNOSIS — R07.9 CHEST PAIN, UNSPECIFIED: ICD-10-CM

## 2020-07-28 LAB
ANION GAP SERPL CALC-SCNC: 13 MMOL/L — SIGNIFICANT CHANGE UP (ref 5–17)
APTT BLD: 30.8 SEC — SIGNIFICANT CHANGE UP (ref 27.5–35.5)
BUN SERPL-MCNC: 24 MG/DL — HIGH (ref 8–20)
CALCIUM SERPL-MCNC: 9.3 MG/DL — SIGNIFICANT CHANGE UP (ref 8.6–10.2)
CHLORIDE SERPL-SCNC: 103 MMOL/L — SIGNIFICANT CHANGE UP (ref 98–107)
CO2 SERPL-SCNC: 24 MMOL/L — SIGNIFICANT CHANGE UP (ref 22–29)
CREAT SERPL-MCNC: 1.07 MG/DL — SIGNIFICANT CHANGE UP (ref 0.5–1.3)
GLUCOSE SERPL-MCNC: 108 MG/DL — HIGH (ref 70–99)
HCT VFR BLD CALC: 40.7 % — SIGNIFICANT CHANGE UP (ref 39–50)
HGB BLD-MCNC: 13.7 G/DL — SIGNIFICANT CHANGE UP (ref 13–17)
INR BLD: 1.03 RATIO — SIGNIFICANT CHANGE UP (ref 0.88–1.16)
MCHC RBC-ENTMCNC: 31.6 PG — SIGNIFICANT CHANGE UP (ref 27–34)
MCHC RBC-ENTMCNC: 33.7 GM/DL — SIGNIFICANT CHANGE UP (ref 32–36)
MCV RBC AUTO: 93.8 FL — SIGNIFICANT CHANGE UP (ref 80–100)
PLATELET # BLD AUTO: 195 K/UL — SIGNIFICANT CHANGE UP (ref 150–400)
POTASSIUM SERPL-MCNC: 4 MMOL/L — SIGNIFICANT CHANGE UP (ref 3.5–5.3)
POTASSIUM SERPL-SCNC: 4 MMOL/L — SIGNIFICANT CHANGE UP (ref 3.5–5.3)
PROTHROM AB SERPL-ACNC: 11.9 SEC — SIGNIFICANT CHANGE UP (ref 10.6–13.6)
RBC # BLD: 4.34 M/UL — SIGNIFICANT CHANGE UP (ref 4.2–5.8)
RBC # FLD: 12.8 % — SIGNIFICANT CHANGE UP (ref 10.3–14.5)
SODIUM SERPL-SCNC: 140 MMOL/L — SIGNIFICANT CHANGE UP (ref 135–145)
WBC # BLD: 7.64 K/UL — SIGNIFICANT CHANGE UP (ref 3.8–10.5)
WBC # FLD AUTO: 7.64 K/UL — SIGNIFICANT CHANGE UP (ref 3.8–10.5)

## 2020-07-28 RX ORDER — ASPIRIN/CALCIUM CARB/MAGNESIUM 324 MG
81 TABLET ORAL DAILY
Refills: 0 | Status: DISCONTINUED | OUTPATIENT
Start: 2020-07-28 | End: 2020-07-29

## 2020-07-28 RX ORDER — COLCHICINE 0.6 MG
1 TABLET ORAL
Qty: 0 | Refills: 0 | DISCHARGE

## 2020-07-28 RX ORDER — COLCHICINE 0.6 MG
0.6 TABLET ORAL DAILY
Refills: 0 | Status: DISCONTINUED | OUTPATIENT
Start: 2020-07-28 | End: 2020-07-29

## 2020-07-28 RX ORDER — ALLOPURINOL 300 MG
1 TABLET ORAL
Qty: 0 | Refills: 0 | DISCHARGE

## 2020-07-28 RX ORDER — ALLOPURINOL 300 MG
100 TABLET ORAL
Refills: 0 | Status: DISCONTINUED | OUTPATIENT
Start: 2020-07-28 | End: 2020-07-29

## 2020-07-28 RX ORDER — CLOPIDOGREL BISULFATE 75 MG/1
75 TABLET, FILM COATED ORAL DAILY
Refills: 0 | Status: DISCONTINUED | OUTPATIENT
Start: 2020-07-28 | End: 2020-07-29

## 2020-07-28 RX ORDER — SIMVASTATIN 20 MG/1
1 TABLET, FILM COATED ORAL
Qty: 0 | Refills: 0 | DISCHARGE

## 2020-07-28 NOTE — DISCHARGE NOTE PROVIDER - NSDCACTIVITY_GEN_ALL_CORE
Stairs allowed/No heavy lifting/straining/Walking - Indoors allowed/Showering allowed/Walking - Outdoors allowed

## 2020-07-28 NOTE — DISCHARGE NOTE PROVIDER - HOSPITAL COURSE
1 year old male pt with PMH:  CAD s/p PCI x 4 at [facility] 2016 as well as in 2000 and 2008, PVD, HTN, HLD, and gout. He presented to cardiologist with chest pain and SOB and ongoing Angina . Medically therapy was attempted with Ranexa which he developed diarrhea . Toprol was started and stopped due to bradycardia and Imdur and amlodipine was discontinued due to dizziness . He was sent for a NST ( Lexiscan ) that demonstrated a moderate sized area of mild ischemia in the basal and apical inferior walls EF 53 % with non limiting chest pressure post Lexiscan injection. He had a 1 year old male pt with PMH:  CAD s/p PCI x 4 at [facility] 2016 as well as in 2000 and 2008, PVD, HTN, HLD, and gout. He presented to cardiologist with chest pain and SOB and ongoing Angina . Medically therapy was attempted with Ranexa which he developed diarrhea . Toprol was started and stopped due to bradycardia and Imdur and amlodipine was discontinued due to dizziness . He was sent for a NST ( Lexiscan ) that demonstrated a moderate sized area of mild ischemia in the basal and apical inferior walls EF 53 % with non limiting chest pressure post Lexiscan injection. He had a right and left heart catheterization which showed:    VENTRICLES: There were no left ventricular global or regional wall motion abnormalities. Global left ventricular function was normal. EF estimated was 65 %.    VALVES: MITRAL VALVE: The mitral valve exhibited mild regurgitation.    CORONARY VESSELS: The coronary circulation is right dominant.    LM:       --  LM: Normal.    LAD:       --  Proximal LAD: There was a diffuse 0 % stenosis at the site of a prior stent.    --  Mid LAD: There was a tubular 40 % stenosis. In a second lesion, there was a tubular 0 % stenosis at the site of a prior stent.    CX:       --  Mid circumflex: The vessel was mildly calcified. Angiography showed mild atherosclerosis with no flow limiting lesions. There was a tubular 20 % stenosis.    RCA:       --  Proximal RCA: Angiography showed minor luminal irregularities with no flow limiting lesions.    --  RPDA: Angiography showed mild atherosclerosis with no flow limiting lesions.    Right Heart Pressures:         RA: 4         RV: 27/8         PA: 22/8/15         PCWP: 12         CO: 4.59         CI: 2.05 80 year old male pt with PMH:  CAD s/p PCI x 4 at [facility] 2016 as well as in 2000 and 2008, PVD, HTN, HLD, and gout. He presented to cardiologist with chest pain and SOB and ongoing Angina . Medically therapy was attempted with Ranexa which he developed diarrhea . Toprol was started and stopped due to bradycardia and Imdur and amlodipine was discontinued due to dizziness . He was sent for a NST ( Lexiscan ) that demonstrated a moderate sized area of mild ischemia in the basal and apical inferior walls EF 53 % with non limiting chest pressure post Lexiscan injection. He had a right and left heart catheterization which showed:    VENTRICLES: There were no left ventricular global or regional wall motion abnormalities. Global left ventricular function was normal. EF estimated was 65 %.    VALVES: MITRAL VALVE: The mitral valve exhibited mild regurgitation.    CORONARY VESSELS: The coronary circulation is right dominant.    LM:       --  LM: Normal.    LAD:       --  Proximal LAD: There was a diffuse 0 % stenosis at the site of a prior stent.    --  Mid LAD: There was a tubular 40 % stenosis. In a second lesion, there was a tubular 0 % stenosis at the site of a prior stent.    CX:       --  Mid circumflex: The vessel was mildly calcified. Angiography showed mild atherosclerosis with no flow limiting lesions. There was a tubular 20 % stenosis.    RCA:       --  Proximal RCA: Angiography showed minor luminal irregularities with no flow limiting lesions.    --  RPDA: Angiography showed mild atherosclerosis with no flow limiting lesions.    Right Heart Pressures:         RA: 4         RV: 27/8         PA: 22/8/15         PCWP: 12         CO: 4.59         CI: 2.05

## 2020-07-28 NOTE — ASU PATIENT PROFILE, ADULT - PMH
Coronary artery disease involving native coronary artery of native heart without angina pectoris    Gout    High cholesterol    Hypertension    Myocardial infarction, unspecified MI type, unspecified artery  2016 at [facility] s/p PCI

## 2020-07-28 NOTE — CONSULT NOTE ADULT - SUBJECTIVE AND OBJECTIVE BOX
Patient is a 80y old  Male who presents with a chief complaint of shortness of breath and increasing chest pain       HPI:   80 year old male pt with PMH:  CAD s/p PCI x 4 at [facility] 2016 as well as in 2000 and 2008, PVD, HTN, HLD, and gout. He presented to cardiologist with chest pain and SOB and ongoing Angina . Medically therapy was attempted with Ranexa which he developed diarrhea . Toprol was started and stopped due to bradycardia and Imdur and amlodipine was discontinued due to dizziness . He was sent for a NST ( Lexiscan ) that demonstrated a moderate sized area of mild ischemia in the basal and apical inferior walls EF 53 % with non limiting chest pressure post Lexiscan injection.     Noninvasive Testing:   Stress Test: Date: 7/2020       Protocol: Nuclear stress test       Duration of Exercise: NA       Symptoms: 5/6 chest pain       EKG Changes: no       DTS:        Myocardial Imaging: moderate sized area of mild ischemia in the basal to apical inferior wall.       Risk Assessment (Low, Medium, High):     PAST MEDICAL & SURGICAL HISTORY:  Myocardial infarction, unspecified MI type, unspecified artery: 2016 at [facility] s/p PCI  Coronary artery disease involving native coronary artery of native heart without angina pectoris  Gout  High cholesterol  Hypertension  H/O gastric bypass  Gallbladder pain  Acute appendicitis, unspecified acute appendicitis type  Acute carpal tunnel syndrome, unspecified laterality  PVD (peripheral vascular disease)      Allergies    No Known Allergies    Intolerances        MEDICATIONS  (STANDING):  allopurinol 100 milliGRAM(s) Oral two times a day  aspirin  chewable 81 milliGRAM(s) Oral daily  clopidogrel Tablet 75 milliGRAM(s) Oral daily  colchicine 0.6 milliGRAM(s) Oral daily    MEDICATIONS  (PRN):    allopurinol 100 milliGRAM(s) Oral two times a day  aspirin  chewable 81 milliGRAM(s) Oral daily  clopidogrel Tablet 75 milliGRAM(s) Oral daily  colchicine 0.6 milliGRAM(s) Oral daily      FAMILY HISTORY:  Family history of myocardial infarction (Sibling): brother at 39 yo      SOCIAL HISTORY:    CIGARETTES: Never    ALCOHOL:  Rare    REVIEW OF SYSTEMS:  CONSTITUTIONAL: No fever, weight loss, or fatigue  EYES: No eye pain, visual disturbances, or discharge  ENMT:  No difficulty hearing, tinnitus, vertigo; No sinus or throat pain  NECK: No pain or stiffness  RESPIRATORY: No cough, wheezing, chills or hemoptysis;   CARDIOVASCULAR: No palpitations, passing out, dizziness, or leg swelling  GASTROINTESTINAL: No abdominal or epigastric pain. No nausea, vomiting, or hematemesis; No diarrhea or constipation. No melena or hematochezia.  GENITOURINARY: No dysuria, frequency, hematuria, or incontinence  NEUROLOGICAL: No headaches, memory loss, loss of strength, numbness, or tremors  SKIN: No itching, burning, rashes, or lesions   LYMPH Nodes: No enlarged glands  ENDOCRINE: No heat or cold intolerance; No hair loss  MUSCULOSKELETAL: No joint pain or swelling; No muscle, back, or extremity pain  PSYCHIATRIC: No depression, anxiety, mood swings, or difficulty sleeping  HEME/LYMPH: No easy bruising, or bleeding gums  ALLERY AND IMMUNOLOGIC: No hives or eczema	    Vital Signs Last 24 Hrs  T(C): 36.4 (28 Jul 2020 14:07), Max: 36.4 (28 Jul 2020 14:07)  T(F): 97.6 (28 Jul 2020 14:07), Max: 97.6 (28 Jul 2020 14:07)  HR: 62 (28 Jul 2020 14:07) (62 - 62)  BP: 138/67 (28 Jul 2020 14:07) (138/67 - 138/67)  BP(mean): --  RR: 18 (28 Jul 2020 14:07) (18 - 18)  SpO2: 94% (28 Jul 2020 14:07) (94% - 94%)    Daily Height in cm: 177.8 (28 Jul 2020 14:07)    Daily     I&O's Detail      PHYSICAL EXAM:  Appearance: Normal, well nourished	  HEENT:   Normal oral mucosa, PERRL, EOMI, sclera non-icteric	  Lymphatic: No cervical lymphadenopathy  Cardiovascular: Normal S1 S2, No JVD, No cardiac murmurs, No carotid bruits, No peripheral edema  Respiratory: Lungs clear to auscultation	  Psychiatry: A & O x 3, Mood & affect appropriate  Gastrointestinal:  Soft, Non-tender, + BS, no bruits	  Skin: No rashes, No ecchymoses, No cyanosis  Neurologic: Grossly non-focal with full strength in all four extremities  Extremities: Normal range of motion, No clubbing, cyanosis or edema  Vascular: Peripheral pulses palpable 2+ bilaterally    LABS:                        13.7   7.64  )-----------( 195      ( 28 Jul 2020 14:08 )             40.7     07-28    140  |  103  |  24.0<H>  ----------------------------<  108<H>  4.0   |  24.0  |  1.07    Ca    9.3      28 Jul 2020 14:08          PT/INR - ( 28 Jul 2020 14:08 )   PT: 11.9 sec;   INR: 1.03 ratio         PTT - ( 28 Jul 2020 14:08 )  PTT:30.8 sec    I&O's Summary    BNP  RADIOLOGY & ADDITIONAL STUDIES:    Assessment:  80 year old male pt with PMH:  CAD s/p PCI x 4 at [facility] 2016 as well as in 2000 and 2008, PVD, HTN, HLD, and gout. He presented to cardiologist with chest pain and SOB and ongoing Angina . Medically therapy was attempted with Ranexa which he developed diarrhea . Toprol was started and stopped due to bradycardia and Imdur and amlodipine was discontinued due to dizziness . He was sent for a NST ( Lexiscan ) that demonstrated a moderate sized area of mild ischemia in the basal and apical inferior walls EF 53 % with non limiting chest pressure post Lexiscan injection.     Plan:  Cardiac catheterization and possible percutaneous intervention recommended.  Risks, benefits, and alternatives reviewed.  Risks including but not limited to MI, death, stroke, bleeding, infection, vessel injury, hematoma, renal failure, allergic reaction, urgent open heart surgery, restenosis and stent thrombosis were reviewed.  All questions answered.  Patient is agreeable to proceed.

## 2020-07-28 NOTE — DISCHARGE NOTE PROVIDER - CARE PROVIDERS DIRECT ADDRESSES
,DirectAddress_Unknown ,DirectAddress_Unknown,[email or web address]

## 2020-07-28 NOTE — ASU PATIENT PROFILE, ADULT - PAIN CHRONIC, PROFILE
"no

## 2020-07-28 NOTE — DISCHARGE NOTE PROVIDER - NSDCMRMEDTOKEN_GEN_ALL_CORE_FT
allopurinol 100 mg oral tablet: 1 tab(s) orally 2 times a day  aspirin 81 mg oral tablet, chewable: 1 tab(s) orally once a day  clopidogrel 75 mg oral tablet: 1 tab(s) orally once a day  colchicine 0.6 mg oral tablet: 1 tab(s) orally once a day

## 2020-07-28 NOTE — DISCHARGE NOTE PROVIDER - PROVIDER TOKENS
PROVIDER:[TOKEN:[[id number]],FOLLOWUP:[2 weeks],ESTABLISHEDPATIENT:[T]] PROVIDER:[TOKEN:[[id number]],FOLLOWUP:[2 weeks],ESTABLISHEDPATIENT:[T]],PROVIDER:[TOKEN:[[id number]]]

## 2020-07-28 NOTE — PROGRESS NOTE ADULT - SUBJECTIVE AND OBJECTIVE BOX
SUBJECTIVE:  Cardiology NP Pre LHC:   HPI:  Narrative: 80 year old male pt with PMH:  CAD s/p PCI x 4 at [facility] 2016 as well as in 2000 and 2008, PVD, HTN, HLD, and gout. He presented to cardiologist with chest pain and SOB and ongoing Angina . Medically therapy was attempted with Ranexa which he developed diarrhea . Toprol was started and stopped due to bradycardia and Imdur and amlodipine was discontinued due to dizziness . He was sent for a NST ( Lexiscan ) that demonstrated a moderate sized area of mild ischemia in the basal and apical inferior walls EF 53 % with non limiting chest pressure post Lexiscan injection.   He was referred for cardiac cath     ASA: 2  Mallampati: 2  BRA: 0.7      Prior Cardiac Interventions: < from: Cardiac Cath Lab - Adult (04.17.18 @ 15:21) >  LM:   --  LM: Normal.  LAD:   --  Proximal LAD: There was a tubular 0 % stenosis at the site of a  prior stent.  --  Mid LAD: There was a diffuse 75 % stenosis at the site of a prior  stent. In a second lesion, there was a tubular 75 % stenosis.  --  D1: There was a tubular 30 % stenosis at the ostium of the vessel  segment.  CX:   --  Circumflex: Angiography showed mild atherosclerosis with no flow  limiting lesions.  RCA:   --  RCA: Angiography showed minor luminal irregularities with no  flow limiting lesions.  --  RPDA: Angiography showed mild atherosclerosis with no flow limiting  lesions.  COMPLICATIONS: No complications occurred during the cath lab visit.  SUMMARY:  Summary: Addendum 4/18/2018: Case reconfirmed to add Dr. Hernandez as  Interventional Cardiologist and generat Interventional DMS report  INTERVENTIONAL RECOMMENDATIONS: Continue clopidogrel (Plavix), 75 mg, PO,  daily. Continue aspirin, 81 mg, PO, daily.  Prepared and signed by  Prabhu Hernandez MD  Signed 04/18/2018 12:28:27    < end of copied text >      Noninvasive Testing:   Stress Test: Date: 7/2020       Protocol: Nuclear stress test       Duration of Exercise: NA       Symptoms: 5/6 chest pain       EKG Changes: no       DTS:        Myocardial Imaging: moderate sized area of mild ischemia in the basal to apical inferior wall.       Risk Assessment (Low, Medium, High):     Echo (Date, Findings): 7/2020: EF 60-65%. Mild to moderate pulmonic regurgitation. Mild TR. Ascending aorta is enlarged measuring 4.3cm. Stable from 3/2017.    Antianginal Therapies:        Beta Blockers:  no       Calcium Channel Blockers: no       Long Acting Nitrates: no       Ranexa: no (27 Jul 2020 15:38)      ASA: 2  Mallampati: 2  Bleeding Risk Score:   0.7    All Ros negative unless otherwise indicated below:    MEDICATIONS  (STANDING):    MEDICATIONS  (PRN):      PHYSICAL EXAM:  T(C): 36.4 (07-28-20 @ 14:07), Max: 36.4 (07-28-20 @ 14:07)  HR: 62 (07-28-20 @ 14:07) (62 - 62)  BP: 138/67 (07-28-20 @ 14:07) (138/67 - 138/67)  RR: 18 (07-28-20 @ 14:07) (18 - 18)  SpO2: 94% (07-28-20 @ 14:07) (94% - 94%)  Wt(kg): --    Daily       Appearance: Normal	  HEENT:   Normal oral mucosa,   Lymphatic: No lymphadenopathy  Cardiovascular: Normal S1 S2, No JVD, No murmurs, No edema  Respiratory: Lungs clear to auscultation	  Gastrointestinal:  Soft, Non-tender, + BS	  Skin: warm and dry  Neurologic: Non-focal  Extremities: Normal range of motion,:  Vascular: Peripheral pulses palpable 2+ bilaterally    TELEMETRY: 	      DIAGNOSTIC TESTING:  [x ] Echocardiogram: normal lv moderate aortic regurg.   [ ] Catheterization:  [x ] Stress Test:  abnormal inferior wall	    LABS:	                              13.7   7.64  )-----------( 195      ( 28 Jul 2020 14:08 )             40.7     07-28    140  |  103  |  24.0<H>  ----------------------------<  108<H>  4.0   |  24.0  |  1.07    Ca    9.3      28 Jul 2020 14:08      PT/INR - ( 28 Jul 2020 14:08 )   PT: 11.9 sec;   INR: 1.03 ratio         PTT - ( 28 Jul 2020 14:08 )  PTT:30.8 sec            ASSESSMENT:      Plan:

## 2020-07-28 NOTE — PROGRESS NOTE ADULT - SUBJECTIVE AND OBJECTIVE BOX
Department of Cardiology                                                                  [facility]/[place] [address] [place] [place] [address]                                                            Telephone: [phone]. Fax:[phone]                                                                           Cardiac Catheterization Note       Subjective:  80y  Male who had a left heart catheterization which showed:    Right Heart Pressures:       RA: 4       RV: 27/2/8       PA: 22/8/15       PCWP: 12       CO: 4.59       CI: 2.05    PAST MEDICAL & SURGICAL HISTORY:  Myocardial infarction, unspecified MI type, unspecified artery: 2016 at [facility] s/p PCI  Coronary artery disease involving native coronary artery of native heart without angina pectoris  Gout  High cholesterol  Hypertension  H/O gastric bypass  Gallbladder pain  Acute appendicitis, unspecified acute appendicitis type  Acute carpal tunnel syndrome, unspecified laterality  PVD (peripheral vascular disease)    FAMILY HISTORY:  Family history of myocardial infarction (Sibling): brother at 37 yo    Home Medications:  allopurinol 100 mg oral tablet: 1 tab(s) orally 2 times a day (28 Jul 2020 13:56)  aspirin 81 mg oral tablet, chewable: 1 tab(s) orally once a day (28 Jul 2020 13:56)  colchicine 0.6 mg oral tablet: 1 tab(s) orally once a day (28 Jul 2020 13:56)    HPI: 80 year old male pt with PMH:  CAD s/p PCI x 4 at [facility] 2016 as well as in 2000 and 2008, PVD, HTN, HLD, and gout. He presented to cardiologist with chest pain and SOB and ongoing Angina . Medically therapy was attempted with Ranexa which he developed diarrhea . Toprol was started and stopped due to bradycardia and Imdur and amlodipine was discontinued due to dizziness . He was sent for a NST ( Lexiscan ) that demonstrated a moderate sized area of mild ischemia in the basal and apical inferior walls EF 53 % with non limiting chest pressure post Lexiscan injection.   He was referred for cardiac cath     General: No fatigue, no fevers/chills  Respiratory: No dyspnea, no cough, no wheeze  CV: No chest pain, no palpitations  Abd: No nausea  Neuro: No headache, no dizziness    No Known Allergies    Objective:  Vital Signs Last 24 Hrs  T(C): 36.4 (28 Jul 2020 14:07), Max: 36.4 (28 Jul 2020 14:07)  T(F): 97.6 (28 Jul 2020 14:07), Max: 97.6 (28 Jul 2020 14:07)  HR: 62 (28 Jul 2020 14:07) (62 - 62)  BP: 138/67 (28 Jul 2020 14:07) (138/67 - 138/67)  RR: 18 (28 Jul 2020 14:07) (18 - 18)  SpO2: 94% (28 Jul 2020 14:07) (94% - 94%)    General: Awake, alert, speech clear, in no acute distress.  Chest: CTA, S1, S2, no murmurs.  Abdomen, Soft, nondistended  Right Groin: Soft, no bleeding, no hematoma.  Extremities: No edema, + distal pulses.                          13.7   7.64  )-----------( 195      ( 28 Jul 2020 14:08 )             40.7     07-28    140  |  103  |  24.0  ----------------------------<  108  4.0   |  24.0  |  1.07    Ca    9.3      28 Jul 2020 14:08    PT/INR - ( 28 Jul 2020 14:08 )   PT: 11.9 sec;   INR: 1.03 ratio    PTT - ( 28 Jul 2020 14:08 )  PTT:30.8 sec Department of Cardiology                                                                  [facility]/[place] [address] [address] [facility]                                                            Telephone: [phone]. Fax:[phone]                                                                           Cardiac Catheterization Note       Subjective:  80y  Male who had a left heart catheterization which showed:  VENTRICLES: There were no left ventricular global or regional wall motion abnormalities. Global left ventricular function was normal. EF estimated was 65 %.  VALVES: MITRAL VALVE: The mitral valve exhibited mild regurgitation.  CORONARY VESSELS: The coronary circulation is right dominant.  LM:     --  LM: Normal.  LAD:     --  Proximal LAD: There was a diffuse 0 % stenosis at the site of a prior stent.  --  Mid LAD: There was a tubular 40 % stenosis. In a second lesion, there was a tubular 0 % stenosis at the site of a prior stent.  CX:     --  Mid circumflex: The vessel was mildly calcified. Angiography showed mild atherosclerosis with no flow limiting lesions. There was a tubular 20 % stenosis.  RCA:     --  Proximal RCA: Angiography showed minor luminal irregularities with no flow limiting lesions.  --  RPDA: Angiography showed mild atherosclerosis with no flow limiting lesions.  Right Heart Pressures:       RA: 4       RV: 27/8       PA: 22/8/15       PCWP: 12       CO: 4.59       CI: 2.05    PAST MEDICAL & SURGICAL HISTORY:  Myocardial infarction, unspecified MI type, unspecified artery: 2016 at [facility] s/p PCI  Coronary artery disease involving native coronary artery of native heart without angina pectoris  Gout  High cholesterol  Hypertension  H/O gastric bypass  Gallbladder pain  Acute appendicitis, unspecified acute appendicitis type  Acute carpal tunnel syndrome, unspecified laterality  PVD (peripheral vascular disease)    FAMILY HISTORY:  Family history of myocardial infarction (Sibling): brother at 39 yo    Home Medications:  allopurinol 100 mg oral tablet: 1 tab(s) orally 2 times a day (28 Jul 2020 13:56)  aspirin 81 mg oral tablet, chewable: 1 tab(s) orally once a day (28 Jul 2020 13:56)  colchicine 0.6 mg oral tablet: 1 tab(s) orally once a day (28 Jul 2020 13:56)    HPI: 80 year old male pt with PMH:  CAD s/p PCI x 4 at [facility] 2016 as well as in 2000 and 2008, PVD, HTN, HLD, and gout. He presented to cardiologist with chest pain and SOB and ongoing Angina . Medically therapy was attempted with Ranexa which he developed diarrhea . Toprol was started and stopped due to bradycardia and Imdur and amlodipine was discontinued due to dizziness . He was sent for a NST ( Lexiscan ) that demonstrated a moderate sized area of mild ischemia in the basal and apical inferior walls EF 53 % with non limiting chest pressure post Lexiscan injection.   He was referred for cardiac cath     General: No fatigue, no fevers/chills  Respiratory: No dyspnea, no cough, no wheeze  CV: No chest pain, no palpitations  Abd: No nausea  Neuro: No headache, no dizziness    No Known Allergies    Objective:  Vital Signs Last 24 Hrs  T(C): 36.4 (28 Jul 2020 14:07), Max: 36.4 (28 Jul 2020 14:07)  T(F): 97.6 (28 Jul 2020 14:07), Max: 97.6 (28 Jul 2020 14:07)  HR: 62 (28 Jul 2020 14:07) (62 - 62)  BP: 138/67 (28 Jul 2020 14:07) (138/67 - 138/67)  RR: 18 (28 Jul 2020 14:07) (18 - 18)  SpO2: 94% (28 Jul 2020 14:07) (94% - 94%)    General: Awake, alert, speech clear, in no acute distress.  Chest: CTA, S1, S2, no murmurs.  Abdomen, Soft, nondistended  Right Groin: Soft, no bleeding, no hematoma.  Extremities: No edema, + distal pulses.                          13.7   7.64  )-----------( 195      ( 28 Jul 2020 14:08 )             40.7     07-28    140  |  103  |  24.0  ----------------------------<  108  4.0   |  24.0  |  1.07    Ca    9.3      28 Jul 2020 14:08    PT/INR - ( 28 Jul 2020 14:08 )   PT: 11.9 sec;   INR: 1.03 ratio    PTT - ( 28 Jul 2020 14:08 )  PTT:30.8 sec Department of Cardiology                                                                  [facility]/[place] [address] [address] [facility]                                                            Telephone: [phone]. Fax:[phone]                                                                           Cardiac Catheterization Note       Subjective:  80y  Male who had a right and left heart catheterization which showed:  VENTRICLES: There were no left ventricular global or regional wall motion abnormalities. Global left ventricular function was normal. EF estimated was 65 %.  VALVES: MITRAL VALVE: The mitral valve exhibited mild regurgitation.  CORONARY VESSELS: The coronary circulation is right dominant.  LM:     --  LM: Normal.  LAD:     --  Proximal LAD: There was a diffuse 0 % stenosis at the site of a prior stent.  --  Mid LAD: There was a tubular 40 % stenosis. In a second lesion, there was a tubular 0 % stenosis at the site of a prior stent.  CX:     --  Mid circumflex: The vessel was mildly calcified. Angiography showed mild atherosclerosis with no flow limiting lesions. There was a tubular 20 % stenosis.  RCA:     --  Proximal RCA: Angiography showed minor luminal irregularities with no flow limiting lesions.  --  RPDA: Angiography showed mild atherosclerosis with no flow limiting lesions.  Right Heart Pressures:       RA: 4       RV: 27/8       PA: 22/8/15       PCWP: 12       CO: 4.59       CI: 2.05    PAST MEDICAL & SURGICAL HISTORY:  Myocardial infarction, unspecified MI type, unspecified artery: 2016 at [facility] s/p PCI  Coronary artery disease involving native coronary artery of native heart without angina pectoris  Gout  High cholesterol  Hypertension  H/O gastric bypass  Gallbladder pain  Acute appendicitis, unspecified acute appendicitis type  Acute carpal tunnel syndrome, unspecified laterality  PVD (peripheral vascular disease)    FAMILY HISTORY:  Family history of myocardial infarction (Sibling): brother at 39 yo    Home Medications:  allopurinol 100 mg oral tablet: 1 tab(s) orally 2 times a day (28 Jul 2020 13:56)  aspirin 81 mg oral tablet, chewable: 1 tab(s) orally once a day (28 Jul 2020 13:56)  colchicine 0.6 mg oral tablet: 1 tab(s) orally once a day (28 Jul 2020 13:56)    HPI: 80 year old male pt with PMH:  CAD s/p PCI x 4 at [facility] 2016 as well as in 2000 and 2008, PVD, HTN, HLD, and gout. He presented to cardiologist with chest pain and SOB and ongoing Angina . Medically therapy was attempted with Ranexa which he developed diarrhea . Toprol was started and stopped due to bradycardia and Imdur and amlodipine was discontinued due to dizziness . He was sent for a NST ( Lexiscan ) that demonstrated a moderate sized area of mild ischemia in the basal and apical inferior walls EF 53 % with non limiting chest pressure post Lexiscan injection.   He was referred for cardiac cath     General: No fatigue, no fevers/chills  Respiratory: No dyspnea, no cough, no wheeze  CV: No chest pain, no palpitations  Abd: No nausea  Neuro: No headache, no dizziness    No Known Allergies    Objective:  Vital Signs Last 24 Hrs  T(C): 36.4 (28 Jul 2020 14:07), Max: 36.4 (28 Jul 2020 14:07)  T(F): 97.6 (28 Jul 2020 14:07), Max: 97.6 (28 Jul 2020 14:07)  HR: 62 (28 Jul 2020 14:07) (62 - 62)  BP: 138/67 (28 Jul 2020 14:07) (138/67 - 138/67)  RR: 18 (28 Jul 2020 14:07) (18 - 18)  SpO2: 94% (28 Jul 2020 14:07) (94% - 94%)    General: Awake, alert, speech clear, in no acute distress.  Chest: CTA, S1, S2, no murmurs.  Abdomen, Soft, nondistended  Right Groin: Soft, no bleeding, no hematoma.  Extremities: No edema, + distal pulses.                          13.7   7.64  )-----------( 195      ( 28 Jul 2020 14:08 )             40.7     07-28    140  |  103  |  24.0  ----------------------------<  108  4.0   |  24.0  |  1.07    Ca    9.3      28 Jul 2020 14:08    PT/INR - ( 28 Jul 2020 14:08 )   PT: 11.9 sec;   INR: 1.03 ratio    PTT - ( 28 Jul 2020 14:08 )  PTT:30.8 sec

## 2020-07-28 NOTE — DISCHARGE NOTE PROVIDER - NSDCCPTREATMENT_GEN_ALL_CORE_FT
PRINCIPAL PROCEDURE  Procedure: Left and right heart catheterization  Findings and Treatment: PRINCIPAL PROCEDURE  Procedure: Left and right heart catheterization  Findings and Treatment: no intervention required

## 2020-07-28 NOTE — DISCHARGE NOTE PROVIDER - CARE PROVIDER_API CALL
Prabhu Hernandez  CARDIOVASCULAR DISEASE  [address] [place] [address]  Phone: [phone]  Fax: [phone]  Established Patient  Follow Up Time: 2 weeks Prabhu Hernandez  CARDIOVASCULAR DISEASE  [address] [facility] [address]  [place] [address]  Phone: [phone]  Fax: [phone]  Established Patient  Follow Up Time: 2 weeks    Luis Fernando Peralta  CARDIOLOGY  [address] [place] [address]  Phone: [phone]  Fax: [phone]  Follow Up Time:

## 2020-07-28 NOTE — DISCHARGE NOTE PROVIDER - NSDCCPCAREPLAN_GEN_ALL_CORE_FT
PRINCIPAL DISCHARGE DIAGNOSIS  Diagnosis: Coronary artery disease involving native coronary artery of native heart, angina presence unspecifie  Assessment and Plan of Treatment: Plavix and aspirin

## 2020-07-29 ENCOUNTER — TRANSCRIPTION ENCOUNTER (OUTPATIENT)
Age: 80
End: 2020-07-29

## 2020-07-29 VITALS
OXYGEN SATURATION: 99 % | RESPIRATION RATE: 18 BRPM | HEART RATE: 60 BPM | DIASTOLIC BLOOD PRESSURE: 75 MMHG | SYSTOLIC BLOOD PRESSURE: 168 MMHG

## 2020-07-29 LAB
ANION GAP SERPL CALC-SCNC: 11 MMOL/L — SIGNIFICANT CHANGE UP (ref 5–17)
BUN SERPL-MCNC: 23 MG/DL — HIGH (ref 8–20)
CALCIUM SERPL-MCNC: 9.2 MG/DL — SIGNIFICANT CHANGE UP (ref 8.6–10.2)
CHLORIDE SERPL-SCNC: 102 MMOL/L — SIGNIFICANT CHANGE UP (ref 98–107)
CO2 SERPL-SCNC: 25 MMOL/L — SIGNIFICANT CHANGE UP (ref 22–29)
CREAT SERPL-MCNC: 0.98 MG/DL — SIGNIFICANT CHANGE UP (ref 0.5–1.3)
GLUCOSE SERPL-MCNC: 92 MG/DL — SIGNIFICANT CHANGE UP (ref 70–99)
HCT VFR BLD CALC: 38.4 % — LOW (ref 39–50)
HGB BLD-MCNC: 13 G/DL — SIGNIFICANT CHANGE UP (ref 13–17)
MAGNESIUM SERPL-MCNC: 2.1 MG/DL — SIGNIFICANT CHANGE UP (ref 1.6–2.6)
MCHC RBC-ENTMCNC: 31.9 PG — SIGNIFICANT CHANGE UP (ref 27–34)
MCHC RBC-ENTMCNC: 33.9 GM/DL — SIGNIFICANT CHANGE UP (ref 32–36)
MCV RBC AUTO: 94.3 FL — SIGNIFICANT CHANGE UP (ref 80–100)
PLATELET # BLD AUTO: 191 K/UL — SIGNIFICANT CHANGE UP (ref 150–400)
POTASSIUM SERPL-MCNC: 4.3 MMOL/L — SIGNIFICANT CHANGE UP (ref 3.5–5.3)
POTASSIUM SERPL-SCNC: 4.3 MMOL/L — SIGNIFICANT CHANGE UP (ref 3.5–5.3)
RBC # BLD: 4.07 M/UL — LOW (ref 4.2–5.8)
RBC # FLD: 12.7 % — SIGNIFICANT CHANGE UP (ref 10.3–14.5)
SODIUM SERPL-SCNC: 138 MMOL/L — SIGNIFICANT CHANGE UP (ref 135–145)
WBC # BLD: 8.12 K/UL — SIGNIFICANT CHANGE UP (ref 3.8–10.5)
WBC # FLD AUTO: 8.12 K/UL — SIGNIFICANT CHANGE UP (ref 3.8–10.5)

## 2020-07-29 PROCEDURE — C1760: CPT

## 2020-07-29 PROCEDURE — 85730 THROMBOPLASTIN TIME PARTIAL: CPT

## 2020-07-29 PROCEDURE — 99153 MOD SED SAME PHYS/QHP EA: CPT

## 2020-07-29 PROCEDURE — C1889: CPT

## 2020-07-29 PROCEDURE — 93460 R&L HRT ART/VENTRICLE ANGIO: CPT

## 2020-07-29 PROCEDURE — 85027 COMPLETE CBC AUTOMATED: CPT

## 2020-07-29 PROCEDURE — 99152 MOD SED SAME PHYS/QHP 5/>YRS: CPT

## 2020-07-29 PROCEDURE — 83735 ASSAY OF MAGNESIUM: CPT

## 2020-07-29 PROCEDURE — C1894: CPT

## 2020-07-29 PROCEDURE — 80048 BASIC METABOLIC PNL TOTAL CA: CPT

## 2020-07-29 PROCEDURE — 93458 L HRT ARTERY/VENTRICLE ANGIO: CPT

## 2020-07-29 PROCEDURE — 85610 PROTHROMBIN TIME: CPT

## 2020-07-29 PROCEDURE — 93005 ELECTROCARDIOGRAM TRACING: CPT

## 2020-07-29 PROCEDURE — C1887: CPT

## 2020-07-29 PROCEDURE — 36415 COLL VENOUS BLD VENIPUNCTURE: CPT

## 2020-07-29 PROCEDURE — C1769: CPT

## 2020-07-29 RX ORDER — ACETAMINOPHEN 500 MG
650 TABLET ORAL EVERY 6 HOURS
Refills: 0 | Status: DISCONTINUED | OUTPATIENT
Start: 2020-07-29 | End: 2020-07-29

## 2020-07-29 RX ADMIN — Medication 100 MILLIGRAM(S): at 06:18

## 2020-07-29 NOTE — DISCHARGE NOTE NURSING/CASE MANAGEMENT/SOCIAL WORK - PATIENT PORTAL LINK FT
You can access the FollowMyHealth Patient Portal offered by [facility] by registering at the following website: [email or web address]. By joining [employer]’s FollowMyHealth portal, you will also be able to view your health information using other applications (apps) compatible with our system.

## 2020-07-29 NOTE — PROGRESS NOTE ADULT - SUBJECTIVE AND OBJECTIVE BOX
Cardiology Nurse Practitioner Note  Nurse Practitioner Progress note:     INTERVAL HISTORY: 80 year old male with prior cardiac stents with c/o cp and positive stress test.    MEDICATIONS:  acetaminophen   Tablet .. 650 milliGRAM(s) Oral every 6 hours PRN  allopurinol 100 milliGRAM(s) Oral two times a day  colchicine 0.6 milliGRAM(s) Oral daily  aspirin  chewable 81 milliGRAM(s) Oral daily  clopidogrel Tablet 75 milliGRAM(s) Oral daily      TELEMETRY: NSR    T(C): 36.4 (07-28-20 @ 14:07), Max: 36.4 (07-28-20 @ 14:07)  HR: 61 (07-29-20 @ 07:01) (56 - 77)  BP: 114/64 (07-29-20 @ 07:01) (114/64 - 171/77)  RR: 16 (07-29-20 @ 07:01) (16 - 20)  SpO2: 99% (07-29-20 @ 07:01) (92% - 100%)  Wt(kg): --    PHYSICAL EXAM:  Appearance: Normal	  Cardiovascular: Normal S1 S2, No JVD, No murmurs, No edema  Respiratory: Lungs clear to auscultation	  Psychiatry: A & O x 3, Mood & affect appropriate  Gastrointestinal:  Soft, Non-tender, + BS	  Neurologic: Non-focal, A&O X3.  No neuro deficits  Procedure Site: Right groin dressing removed.  Site benign.  No bleeding/hematoma/ecchymosis.  + palp pedal pulse.        LABS:	 	                          13.0   8.12  )-----------( 191      ( 29 Jul 2020 03:03 )             38.4     07-29    138  |  102  |  23.0<H>  ----------------------------<  92  4.3   |  25.0  |  0.98    Ca    9.2      29 Jul 2020 03:03  Mg     2.1     07-29        PROCEDURE RESULTS: S/P C pod #1.  No intervention was required.  Pt remained overnight d/t age, late case and dizziness.    < from: Cardiac Cath Lab - Adult (07.28.20 @ 18:51) >  VALVES: MITRAL VALVE: The mitral valve exhibited mild regurgitation.  CORONARY VESSELS: The coronary circulation is right dominant.  LM:   --  LM: Normal.  LAD:   --  Proximal LAD: There was a diffuse 0 % stenosis at the site of a  prior stent.  --  Mid LAD: There was a tubular 40 % stenosis. In a second lesion, there  was a tubular 0 % stenosis at the site of a prior stent.  CX:   --  Mid circumflex: The vessel was mildly calcified. Angiography  showed mild atherosclerosis with no flow limiting lesions. There was a  tubular 20 % stenosis.  RCA:   --  Proximal RCA: Angiography showed minor luminal irregularities  with no flow limiting lesions.  --  RPDA: Angiography showed mild atherosclerosis with no flow limiting  lesions.  COMPLICATIONS: No complications occurred during the cath lab visit.  DIAGNOSTIC RECOMMENDATIONS: The patient should continue with the present  medications.    < end of copied text >    ASSESSMENT/PLAN: 	  -Groin precautions reviewed  -Resume home meds  -Follow up with Dr. Peralta  -Discharge to home

## 2021-01-16 ENCOUNTER — EMERGENCY (EMERGENCY)
Facility: HOSPITAL | Age: 81
LOS: 1 days | Discharge: HOME | End: 2021-01-16
Payer: MEDICARE

## 2021-01-16 VITALS
DIASTOLIC BLOOD PRESSURE: 85 MMHG | RESPIRATION RATE: 16 BRPM | TEMPERATURE: 98 F | HEART RATE: 73 BPM | OXYGEN SATURATION: 99 % | HEIGHT: 70 IN | SYSTOLIC BLOOD PRESSURE: 122 MMHG | WEIGHT: 250 LBS

## 2021-01-16 DIAGNOSIS — I73.9 PERIPHERAL VASCULAR DISEASE, UNSPECIFIED: Chronic | ICD-10-CM

## 2021-01-16 DIAGNOSIS — Z98.84 BARIATRIC SURGERY STATUS: Chronic | ICD-10-CM

## 2021-01-16 DIAGNOSIS — K82.9 DISEASE OF GALLBLADDER, UNSPECIFIED: Chronic | ICD-10-CM

## 2021-01-16 DIAGNOSIS — G56.00 CARPAL TUNNEL SYNDROME, UNSPECIFIED UPPER LIMB: Chronic | ICD-10-CM

## 2021-01-16 DIAGNOSIS — K35.80 UNSPECIFIED ACUTE APPENDICITIS: Chronic | ICD-10-CM

## 2021-01-16 PROCEDURE — 99283 EMERGENCY DEPT VISIT LOW MDM: CPT

## 2021-01-16 PROCEDURE — U0003: CPT

## 2021-01-16 PROCEDURE — U0005: CPT

## 2021-01-17 LAB — SARS-COV-2 RNA SPEC QL NAA+PROBE: DETECTED

## 2021-01-24 NOTE — ED PROVIDER NOTE - PATIENT PORTAL LINK FT
You can access the FollowMyHealth Patient Portal offered by [facility] by registering at the following website: [email or web address]. By joining [facility]’s FollowMyHealth portal, you will also be able to view your health information using other applications (apps) compatible with our system.

## 2023-03-30 NOTE — H&P ADULT - FAMILY HISTORY
Father  Still living? Unknown  Family history of myocardial infarction, Age at diagnosis: Age Unknown     Mother  Still living? Unknown  Family history of myocardial infarction, Age at diagnosis: Age Unknown     Sibling  Still living? Unknown  Family history of myocardial infarction, Age at diagnosis: Age Unknown 2 seconds or less

## 2023-08-30 NOTE — PROGRESS NOTE ADULT - SUBJECTIVE AND OBJECTIVE BOX
Cardiology Nurse Practitioner Note    Pt was admitted as outpt d/t age, late case and c/o dizziness when he attempted to get out of bed. Initial (On Arrival)

## 2024-10-22 ENCOUNTER — APPOINTMENT (OUTPATIENT)
Dept: CT IMAGING | Facility: CLINIC | Age: 84
End: 2024-10-22
Payer: MEDICARE

## 2024-10-22 ENCOUNTER — OUTPATIENT (OUTPATIENT)
Dept: OUTPATIENT SERVICES | Facility: HOSPITAL | Age: 84
LOS: 1 days | End: 2024-10-22
Payer: MEDICARE

## 2024-10-22 DIAGNOSIS — D50.9 IRON DEFICIENCY ANEMIA, UNSPECIFIED: ICD-10-CM

## 2024-10-22 DIAGNOSIS — I73.9 PERIPHERAL VASCULAR DISEASE, UNSPECIFIED: Chronic | ICD-10-CM

## 2024-10-22 DIAGNOSIS — K35.80 UNSPECIFIED ACUTE APPENDICITIS: Chronic | ICD-10-CM

## 2024-10-22 DIAGNOSIS — G56.00 CARPAL TUNNEL SYNDROME, UNSPECIFIED UPPER LIMB: Chronic | ICD-10-CM

## 2024-10-22 DIAGNOSIS — Z98.84 BARIATRIC SURGERY STATUS: Chronic | ICD-10-CM

## 2024-10-22 DIAGNOSIS — K82.9 DISEASE OF GALLBLADDER, UNSPECIFIED: Chronic | ICD-10-CM

## 2024-10-22 PROCEDURE — 74261 CT COLONOGRAPHY DX: CPT | Mod: 26,MH

## 2024-11-20 PROCEDURE — 74261 CT COLONOGRAPHY DX: CPT | Mod: MH
